# Patient Record
Sex: FEMALE | Race: OTHER | NOT HISPANIC OR LATINO | URBAN - METROPOLITAN AREA
[De-identification: names, ages, dates, MRNs, and addresses within clinical notes are randomized per-mention and may not be internally consistent; named-entity substitution may affect disease eponyms.]

---

## 2019-05-27 ENCOUNTER — EMERGENCY (EMERGENCY)
Facility: HOSPITAL | Age: 34
LOS: 1 days | End: 2019-05-27
Admitting: EMERGENCY MEDICINE
Payer: MEDICARE

## 2019-05-27 PROCEDURE — 99284 EMERGENCY DEPT VISIT MOD MDM: CPT

## 2019-05-29 PROCEDURE — 93010 ELECTROCARDIOGRAM REPORT: CPT

## 2019-06-25 ENCOUNTER — OUTPATIENT (OUTPATIENT)
Dept: OUTPATIENT SERVICES | Facility: HOSPITAL | Age: 34
LOS: 1 days | End: 2019-06-25
Payer: MEDICARE

## 2019-06-25 DIAGNOSIS — R55 SYNCOPE AND COLLAPSE: ICD-10-CM

## 2019-06-25 DIAGNOSIS — R07.9 CHEST PAIN, UNSPECIFIED: ICD-10-CM

## 2019-06-25 PROCEDURE — A9500: CPT

## 2019-06-25 PROCEDURE — 93016 CV STRESS TEST SUPVJ ONLY: CPT

## 2019-06-25 PROCEDURE — 93018 CV STRESS TEST I&R ONLY: CPT

## 2019-06-25 PROCEDURE — 93017 CV STRESS TEST TRACING ONLY: CPT

## 2019-06-25 PROCEDURE — 78452 HT MUSCLE IMAGE SPECT MULT: CPT | Mod: 26

## 2019-06-25 PROCEDURE — 78452 HT MUSCLE IMAGE SPECT MULT: CPT

## 2021-08-31 ENCOUNTER — INPATIENT (INPATIENT)
Facility: HOSPITAL | Age: 36
LOS: 1 days | Discharge: HOME | DRG: 101 | End: 2021-09-02
Attending: INTERNAL MEDICINE | Admitting: STUDENT IN AN ORGANIZED HEALTH CARE EDUCATION/TRAINING PROGRAM
Payer: MEDICARE

## 2021-08-31 VITALS
DIASTOLIC BLOOD PRESSURE: 77 MMHG | TEMPERATURE: 99 F | OXYGEN SATURATION: 100 % | RESPIRATION RATE: 20 BRPM | HEART RATE: 120 BPM | SYSTOLIC BLOOD PRESSURE: 142 MMHG

## 2021-08-31 DIAGNOSIS — R56.9 UNSPECIFIED CONVULSIONS: ICD-10-CM

## 2021-08-31 LAB
ALBUMIN SERPL ELPH-MCNC: 4.5 G/DL — SIGNIFICANT CHANGE UP (ref 3.3–5.2)
ALP SERPL-CCNC: 72 U/L — SIGNIFICANT CHANGE UP (ref 40–120)
ALT FLD-CCNC: 20 U/L — SIGNIFICANT CHANGE UP
AMPHET UR-MCNC: NEGATIVE — SIGNIFICANT CHANGE UP
ANION GAP SERPL CALC-SCNC: 24 MMOL/L — HIGH (ref 5–17)
APAP SERPL-MCNC: <3 UG/ML — LOW (ref 10–26)
APPEARANCE UR: CLEAR — SIGNIFICANT CHANGE UP
AST SERPL-CCNC: 20 U/L — SIGNIFICANT CHANGE UP
BACTERIA # UR AUTO: ABNORMAL
BARBITURATES UR SCN-MCNC: NEGATIVE — SIGNIFICANT CHANGE UP
BASE EXCESS BLDV CALC-SCNC: -5.6 MMOL/L — LOW (ref -2–3)
BASOPHILS # BLD AUTO: 0.05 K/UL — SIGNIFICANT CHANGE UP (ref 0–0.2)
BASOPHILS NFR BLD AUTO: 0.6 % — SIGNIFICANT CHANGE UP (ref 0–2)
BENZODIAZ UR-MCNC: NEGATIVE — SIGNIFICANT CHANGE UP
BILIRUB SERPL-MCNC: <0.2 MG/DL — LOW (ref 0.4–2)
BILIRUB UR-MCNC: NEGATIVE — SIGNIFICANT CHANGE UP
BUN SERPL-MCNC: 11.1 MG/DL — SIGNIFICANT CHANGE UP (ref 8–20)
CA-I SERPL-SCNC: 1.15 MMOL/L — SIGNIFICANT CHANGE UP (ref 1.15–1.33)
CALCIUM SERPL-MCNC: 9.1 MG/DL — SIGNIFICANT CHANGE UP (ref 8.6–10.2)
CHLORIDE BLDV-SCNC: 104 MMOL/L — SIGNIFICANT CHANGE UP (ref 98–107)
CHLORIDE SERPL-SCNC: 99 MMOL/L — SIGNIFICANT CHANGE UP (ref 98–107)
CO2 SERPL-SCNC: 16 MMOL/L — LOW (ref 22–29)
COCAINE METAB.OTHER UR-MCNC: NEGATIVE — SIGNIFICANT CHANGE UP
COLOR SPEC: YELLOW — SIGNIFICANT CHANGE UP
CREAT SERPL-MCNC: 0.67 MG/DL — SIGNIFICANT CHANGE UP (ref 0.5–1.3)
DIFF PNL FLD: NEGATIVE — SIGNIFICANT CHANGE UP
EOSINOPHIL # BLD AUTO: 0.02 K/UL — SIGNIFICANT CHANGE UP (ref 0–0.5)
EOSINOPHIL NFR BLD AUTO: 0.2 % — SIGNIFICANT CHANGE UP (ref 0–6)
EPI CELLS # UR: NEGATIVE — SIGNIFICANT CHANGE UP
ETHANOL SERPL-MCNC: 183 MG/DL — HIGH (ref 0–9)
GAS PNL BLDV: 136 MMOL/L — SIGNIFICANT CHANGE UP (ref 136–145)
GAS PNL BLDV: SIGNIFICANT CHANGE UP
GLUCOSE BLDV-MCNC: 240 MG/DL — HIGH (ref 70–99)
GLUCOSE SERPL-MCNC: 270 MG/DL — HIGH (ref 70–99)
GLUCOSE UR QL: 1000 MG/DL
HCG SERPL-ACNC: <4 MIU/ML — SIGNIFICANT CHANGE UP
HCO3 BLDV-SCNC: 20 MMOL/L — LOW (ref 22–29)
HCT VFR BLD CALC: 43.3 % — SIGNIFICANT CHANGE UP (ref 34.5–45)
HCT VFR BLDA CALC: 45 % — SIGNIFICANT CHANGE UP (ref 34–46)
HGB BLD CALC-MCNC: 15 G/DL — SIGNIFICANT CHANGE UP (ref 11.7–16.1)
HGB BLD-MCNC: 14.9 G/DL — SIGNIFICANT CHANGE UP (ref 11.5–15.5)
IMM GRANULOCYTES NFR BLD AUTO: 0.3 % — SIGNIFICANT CHANGE UP (ref 0–1.5)
KETONES UR-MCNC: ABNORMAL
LACTATE BLDV-MCNC: 3.3 MMOL/L — HIGH (ref 0.5–2)
LEUKOCYTE ESTERASE UR-ACNC: ABNORMAL
LYMPHOCYTES # BLD AUTO: 1.51 K/UL — SIGNIFICANT CHANGE UP (ref 1–3.3)
LYMPHOCYTES # BLD AUTO: 17.1 % — SIGNIFICANT CHANGE UP (ref 13–44)
MCHC RBC-ENTMCNC: 30.6 PG — SIGNIFICANT CHANGE UP (ref 27–34)
MCHC RBC-ENTMCNC: 34.4 GM/DL — SIGNIFICANT CHANGE UP (ref 32–36)
MCV RBC AUTO: 88.9 FL — SIGNIFICANT CHANGE UP (ref 80–100)
METHADONE UR-MCNC: NEGATIVE — SIGNIFICANT CHANGE UP
MONOCYTES # BLD AUTO: 0.47 K/UL — SIGNIFICANT CHANGE UP (ref 0–0.9)
MONOCYTES NFR BLD AUTO: 5.3 % — SIGNIFICANT CHANGE UP (ref 2–14)
NEUTROPHILS # BLD AUTO: 6.73 K/UL — SIGNIFICANT CHANGE UP (ref 1.8–7.4)
NEUTROPHILS NFR BLD AUTO: 76.5 % — SIGNIFICANT CHANGE UP (ref 43–77)
NITRITE UR-MCNC: NEGATIVE — SIGNIFICANT CHANGE UP
OPIATES UR-MCNC: NEGATIVE — SIGNIFICANT CHANGE UP
PCO2 BLDV: 35 MMHG — LOW (ref 39–42)
PCP SPEC-MCNC: SIGNIFICANT CHANGE UP
PCP UR-MCNC: NEGATIVE — SIGNIFICANT CHANGE UP
PH BLDV: 7.36 — SIGNIFICANT CHANGE UP (ref 7.32–7.43)
PH UR: 6 — SIGNIFICANT CHANGE UP (ref 5–8)
PLATELET # BLD AUTO: 280 K/UL — SIGNIFICANT CHANGE UP (ref 150–400)
PO2 BLDV: 130 MMHG — HIGH (ref 25–45)
POTASSIUM BLDV-SCNC: 3.3 MMOL/L — LOW (ref 3.5–5.1)
POTASSIUM SERPL-MCNC: 3.7 MMOL/L — SIGNIFICANT CHANGE UP (ref 3.5–5.3)
POTASSIUM SERPL-SCNC: 3.7 MMOL/L — SIGNIFICANT CHANGE UP (ref 3.5–5.3)
PROT SERPL-MCNC: 7.4 G/DL — SIGNIFICANT CHANGE UP (ref 6.6–8.7)
PROT UR-MCNC: NEGATIVE MG/DL — SIGNIFICANT CHANGE UP
RBC # BLD: 4.87 M/UL — SIGNIFICANT CHANGE UP (ref 3.8–5.2)
RBC # FLD: 12.3 % — SIGNIFICANT CHANGE UP (ref 10.3–14.5)
RBC CASTS # UR COMP ASSIST: SIGNIFICANT CHANGE UP /HPF (ref 0–4)
SALICYLATES SERPL-MCNC: <0.6 MG/DL — LOW (ref 10–20)
SAO2 % BLDV: 98.4 % — SIGNIFICANT CHANGE UP
SODIUM SERPL-SCNC: 138 MMOL/L — SIGNIFICANT CHANGE UP (ref 135–145)
SP GR SPEC: 1.01 — SIGNIFICANT CHANGE UP (ref 1.01–1.02)
THC UR QL: POSITIVE
UROBILINOGEN FLD QL: NEGATIVE MG/DL — SIGNIFICANT CHANGE UP
WBC # BLD: 8.81 K/UL — SIGNIFICANT CHANGE UP (ref 3.8–10.5)
WBC # FLD AUTO: 8.81 K/UL — SIGNIFICANT CHANGE UP (ref 3.8–10.5)
WBC UR QL: SIGNIFICANT CHANGE UP

## 2021-08-31 PROCEDURE — 99221 1ST HOSP IP/OBS SF/LOW 40: CPT

## 2021-08-31 PROCEDURE — 99223 1ST HOSP IP/OBS HIGH 75: CPT

## 2021-08-31 PROCEDURE — 93010 ELECTROCARDIOGRAM REPORT: CPT

## 2021-08-31 PROCEDURE — 70498 CT ANGIOGRAPHY NECK: CPT | Mod: 26,MA

## 2021-08-31 PROCEDURE — 70496 CT ANGIOGRAPHY HEAD: CPT | Mod: 26,MA

## 2021-08-31 PROCEDURE — 99291 CRITICAL CARE FIRST HOUR: CPT | Mod: GC

## 2021-08-31 PROCEDURE — 70450 CT HEAD/BRAIN W/O DYE: CPT | Mod: 26,MA

## 2021-08-31 RX ORDER — THIAMINE MONONITRATE (VIT B1) 100 MG
1 TABLET ORAL
Qty: 0 | Refills: 0 | DISCHARGE

## 2021-08-31 RX ORDER — EMPAGLIFLOZIN 10 MG/1
1 TABLET, FILM COATED ORAL
Qty: 0 | Refills: 0 | DISCHARGE

## 2021-08-31 RX ORDER — TRAZODONE HCL 50 MG
1 TABLET ORAL
Qty: 0 | Refills: 0 | DISCHARGE

## 2021-08-31 RX ORDER — ERGOCALCIFEROL 1.25 MG/1
1 CAPSULE ORAL
Qty: 0 | Refills: 0 | DISCHARGE

## 2021-08-31 RX ORDER — NORETHINDRONE 0.35 MG/1
1 TABLET ORAL
Qty: 0 | Refills: 0 | DISCHARGE

## 2021-08-31 RX ORDER — FLUTICASONE FUROATE AND VILANTEROL TRIFENATATE 100; 25 UG/1; UG/1
1 POWDER RESPIRATORY (INHALATION)
Qty: 0 | Refills: 0 | DISCHARGE

## 2021-08-31 RX ORDER — LEVETIRACETAM 250 MG/1
1000 TABLET, FILM COATED ORAL ONCE
Refills: 0 | Status: COMPLETED | OUTPATIENT
Start: 2021-08-31 | End: 2021-08-31

## 2021-08-31 RX ORDER — FOLIC ACID 0.8 MG
1 TABLET ORAL
Qty: 0 | Refills: 0 | DISCHARGE

## 2021-08-31 RX ORDER — INSULIN DEGLUDEC 100 U/ML
30 INJECTION, SOLUTION SUBCUTANEOUS
Qty: 0 | Refills: 0 | DISCHARGE

## 2021-08-31 RX ORDER — INSULIN ASPART 100 [IU]/ML
8 INJECTION, SOLUTION SUBCUTANEOUS
Qty: 0 | Refills: 0 | DISCHARGE

## 2021-08-31 RX ORDER — CLONAZEPAM 1 MG
1 TABLET ORAL
Qty: 0 | Refills: 0 | DISCHARGE

## 2021-08-31 RX ORDER — FLUOXETINE HCL 10 MG
1 CAPSULE ORAL
Qty: 0 | Refills: 0 | DISCHARGE

## 2021-08-31 RX ORDER — ARIPIPRAZOLE 15 MG/1
1 TABLET ORAL
Qty: 0 | Refills: 0 | DISCHARGE

## 2021-08-31 RX ORDER — SODIUM CHLORIDE 9 MG/ML
1000 INJECTION INTRAMUSCULAR; INTRAVENOUS; SUBCUTANEOUS ONCE
Refills: 0 | Status: COMPLETED | OUTPATIENT
Start: 2021-08-31 | End: 2021-08-31

## 2021-08-31 RX ADMIN — SODIUM CHLORIDE 1000 MILLILITER(S): 9 INJECTION INTRAMUSCULAR; INTRAVENOUS; SUBCUTANEOUS at 19:51

## 2021-08-31 RX ADMIN — LEVETIRACETAM 400 MILLIGRAM(S): 250 TABLET, FILM COATED ORAL at 20:29

## 2021-08-31 RX ADMIN — Medication 3 MILLIGRAM(S): at 19:15

## 2021-08-31 RX ADMIN — Medication 1 MILLIGRAM(S): at 19:11

## 2021-08-31 NOTE — ED PROVIDER NOTE - CLINICAL SUMMARY MEDICAL DECISION MAKING FREE TEXT BOX
Patient presenting AMS from [facility]. Reported drinking denying drug use. Patient with inconsistent exam noting periods of responsiveness and also flaccid paralysis. Patient with possible focal partial seizure like activity, flexed extremities, however responsive verbally. CT head performed and ativan given.

## 2021-08-31 NOTE — ED PROVIDER NOTE - PROGRESS NOTE DETAILS
Due to critical nature of potentially life-threatening diagnoses based on patient's initial presentation it is medically necessary to perform CT scan without obtaining prior creatinine or GFR with the benefits of the CT scan greatly outweighing the risks of contrast-induced nephropathy. Due to critical nature of potentially life-threatening diagnoses based on patient's initial presentation it is medically necessary to perform CT scan without obtaining prior creatinine or GFR with the benefits of the CT scan greatly outweighing the risks of contrast-induced nephropathy. Patient is unable to consent due to the nature of her medical situation however the need for CT is still urgent. Patient more awake and alert, responding and answering questions in full sentences. Neuro exam intact strength, sensation, and orientation.

## 2021-08-31 NOTE — ED ADULT TRIAGE NOTE - CHIEF COMPLAINT QUOTE
Pt had seizure while at [employer] and states she had 2 beers, when asked if she did any drugs pt laughed and denied. Pt is noted to be a little slow to respond but is oriented to time place and situation. Pt denies bitting tongue or urinary incontinence. Pt with no complaint sat this time and states she takes Klonopin only.

## 2021-08-31 NOTE — CONSULT NOTE ADULT - SUBJECTIVE AND OBJECTIVE BOX
Patient is a 36F who was at [address] this evening having a few drinks and had a seizure like episode in which 911 was called and patient was brought to the [facility] ED. Patient describes a history of seizures since she was 18 years old which have been more prominent in the past two months. Seizure activity is described as a period where she becomes unresponsive, cannot speak or respond to the people around her and her legs feel weak and often given out resulting in a fall.  Patient states that she has had multiple hospital admissions at [facility] for seizures in the past and is followed outpatient by a Neurologist named Dr. Jake Schwarz and a Psychiatrist. Patient was loaded with 1g of keprra in the ED and 4mg of ativan were given. Neuro IR was consulted after findings of an asymmetric prominent vessel in the left frontal lobe. Patient was seen at bedside by provider with examination detailed below.       Physical Exam:  Constitutional: Patient seen at bedside sleeping comfortably upon initial approach     Neuro  * Mental Status:  Patient was easily aroused to voice, but drowsy after ativan administration, followed commands and conversed appropriately   * Cranial Nerves: Pupils 5mm bilaterally and reactive, EOMI, CN 5 sensory distribution intact, CN 7 motor distribution intact, face symmetric   * Motor: RUE 5/5, LUE 5/5, RLE 5/5, LLE 5/5  * Sensory: Sensation intact to light touch  * Reflexes: Not assessed  * Gait: Not assessed      Vital Signs Last 24 Hrs  T(C): 37.1 (31 Aug 2021 18:18), Max: 37.1 (31 Aug 2021 18:18)  T(F): 98.8 (31 Aug 2021 18:18), Max: 98.8 (31 Aug 2021 18:18)  HR: 120 (31 Aug 2021 18:18) (120 - 120)  BP: 142/77 (31 Aug 2021 18:18) (142/77 - 142/77)  RR: 20 (31 Aug 2021 18:18) (20 - 20)  SpO2: 100% (31 Aug 2021 18:18) (100% - 100%)        Labs & Radiology:                        14.9   8.81  )-----------( 280      ( 31 Aug 2021 19:43 )             43.3       08-31    138  |  99  |  11.1  ----------------------------<  270<H>  3.7   |  16.0<L>  |  0.67    Ca    9.1      31 Aug 2021 19:43    TPro  7.4  /  Alb  4.5  /  TBili  <0.2<L>  /  DBili  x   /  AST  20  /  ALT  20  /  AlkPhos  72  08-31      LIVER FUNCTIONS - ( 31 Aug 2021 19:43 )  Alb: 4.5 g/dL / Pro: 7.4 g/dL / ALK PHOS: 72 U/L / ALT: 20 U/L / AST: 20 U/L / GGT: x                 Neurosurgery Imaging: I attest that all recent neurosurgical imaging was personally reviewed  < from: CT Angio Neck w/ IV Cont (08.31.21 @ 20:32) >   EXAM:  CT ANGIO NECK (W)AW IC                         EXAM:  CT ANGIO BRAIN (W)AW IC                          PROCEDURE DATE:  08/31/2021          INTERPRETATION:  CLINICAL HISTORY: ams. . .    TECHNIQUE: Contrast enhanced axial CT images were acquired from the aortic arch to the vertex of the calvarium, during the angiographic phase.  Three-dimensional maximum intensity projection reformats were generated.  90 ml of Omnipaque-350 mg/ml were administered intravenously, without immediate complication.    COMPARISON STUDY: CT head 8/31/2021  MRI brain 11/28/2019.    FINDINGS:    CT ANGIOGRAPHY NECK:    Thoracic aorta and branch vessels: Patent.  Right carotid system: Patent.  No hemodynamically significant stenosis using NASCET criteria.  Noevidence of dissection.  Left carotid system: Patent.  No hemodynamically significant stenosis using NASCET criteria.  No evidence of dissection.  Vertebral arteries: No focal stenosis or dissection.    CT ANGIOGRAPHY BRAIN:    Internal carotid arteries: Patent.  Anterior cerebral arteries: Patent.  Middle cerebral arteries: Patent.  Posterior cerebral arteries: Patent.  Vertebrobasilar: Patent.  Branch vasculature of the posterior circulation is within normal limits.    Vascular lesions: Asymmetric prominent vasculature is noted in the anterior left frontal lobe, raising suspicion for a vascular anomaly(9:208). Consider further evaluation with catheter angiography.      IMPRESSION:    CT angiography neck: No hemodynamically significant stenosis of the bilateral cervical ICAs using NASCET criteria.  Patent vertebral arteries.  No evidence of vascular dissection.    CT angiography brain:  1.  No large vessel occlusion. No evidence of aneurysm.  2.  Asymmetric prominent vasculature is notedin the anterior left frontal lobe, raising suspicion for a vascular anomaly. Consider further evaluation with catheter angiography.      --- End of Report ---            EMRE BARAKAT MD; Attending Radiologist  This document has been electronically signed. Aug 31 2021  9:08PM    < end of copied text >          Assessment: 35yo female with PMH of seizure disorder admitted to medicine service for further workup. Neuro IR was called to evaluate CTA findings of asymmetric prominent vessel in the left frontal lobe.     Plan  - Case discussed with Dr. Micki Beverly and imaging was reviewed. Dr. Beverly doesn't believe that there is an abnormality present and no further workup is warranted from a neuro IR perspective.   - Consult as needed

## 2021-08-31 NOTE — ED PROVIDER NOTE - OBJECTIVE STATEMENT
Patient is a 37yo F presenting after a possible seizure at [address]. Patient reports that she drank alcohol earlier but denied any drug use. Patient minimally responsive during exam with flaccid paralysis. No incontinence or tongue biting noted. Not offering complaints at this time.

## 2021-08-31 NOTE — ED ADULT NURSE REASSESSMENT NOTE - NS ED NURSE REASSESS COMMENT FT1
Pt received, resting quietly, endorsement of witnessed focal seizure today. Pt denies any recollection, no reports of pain at this time. A&Ox3, verbal, ambulatory. Bruising noted ion multiple stages on bilateral arms. Safety maintained will continue to monitor

## 2021-08-31 NOTE — CONSULT NOTE ADULT - ASSESSMENT
Impression: No suspicion for underlying vascular lesion could be a developmental venous anomaly however no indication for cerebral angiogram at the moment.     I have spent 60 minutes in the care and discussion of the case. The patient was seen by the PA and personally seen by me on 9/1/2021.

## 2021-08-31 NOTE — ED PROVIDER NOTE - PHYSICAL EXAMINATION
General: Stated age female in no acute distress, intermittently responsive and asleep  HEENT: Normocephalic, atraumatic. Moist mucous membranes. Oropharynx clear.  Eyes: No scleral icterus. No conjunctival pallor. EOMI. FERNIE.  Neck: Soft and supple. Full ROM without pain.  Cardiac: Regular rate and regular rhythm. No murmurs. No LE edema.  Resp: Lungs CTAB. No wheezes, rales or rhonchi.  Abd: Soft, non-tender, non-distended. No guarding or rebound. No scars, masses, or lesions.  Back: Spine midline and non-tender. No CVA tenderness.    Skin: No rashes, abrasions, or lacerations.  Neuro: AO x 3. 0/5 strength all extremities. Arm drop test negative. Not responsive to toxic stimuli in all extremities.

## 2021-08-31 NOTE — CONSULT NOTE ADULT - ASSESSMENT
IMP: Patient is a 36y old  Female who presents with a chief complaint of seizure.  Patient was drinking (etoh=183) and gambling at [employer] when she had seizure prodrome and then had seizure.   Presented to ed [facility] with "alternation awareness intact with flaccid paralysis" per ED MD.  Given Ativan and Keppra in ED     Hx seizures, migraine headaches, depression, bipolar, anxiety     CTA WITH  Asymmetric prominent vasculature is noted in the anterior left frontal lobe, raising suspicion for a vascular anomaly.    Patient w abnormal MRI 11/2019  non compliant w neurology f/u for seizures,  she states she takes prescribed meds.      neuro exam intact     possible UTI    IMP:  ADMIT MED SVC   MRI BRAIN +/- CONTRAST   MRA BRAIN  Q2H NEURO CKS UNTIL MRI/MRA DONE   NEUROLOGY, NEURO INTERVENTIONAL RADIOLOGY, NEURO ICU, PSYCHIATRIC CONSULTS  SEIZURE PRECAUTIONS.     DISPO AS PER NICU/ NEURO IR

## 2021-08-31 NOTE — CONSULT NOTE ADULT - SUBJECTIVE AND OBJECTIVE BOX
CC: Patient is a 36y old  Female who presents with a chief complaint of seizure  Source; Patient herself, competent   HPI: Patient is a 36y old  Female who presents with a chief complaint of seizure.  Patient was drinking (etoh=183) and gambling at [employer] when she had seizure prodrome and then had seizure.   presented to ed [facility] with alternation awareness intact with flaccid paralysis" per ED MD.  Patient is non compliant with f/u w neurology ( has w/u in [facility])   Patient intact on my exam. VSS  No recent trauma or illness.   Denied incontinence.   No tongue biting   Given Keppra and Ativan in ED     -headache      - Nausea / - Vomiting   denies weakness  denies numbness/ tingling  admits  poor vision baseline      PAST MEDICAL:  DM, MIGRAINE HA  DEPRESSION  ANXIETY  BIPOLAR   ASTHMA      SURGICAL HISTORY:  denied    SOCIAL HISTORY: +  EtOH, +  tobacco 1/2p per wk in past quit 7 yrs ago, +  drugs in past, heroin, cocaine, marijuana none in 10 yrs    FAMILY HISTORY:  Mother: cardiac, uterine fibroids      ROS:  CONSTITUTIONAL: No fever, weight loss, or fatigue  EYES: No eye pain, + visual disturbances, or discharge  ENMT:  No difficulty hearing, tinnitus, vertigo; No sinus or throat pain  NECK: No pain or stiffness  RESPIRATORY: No cough, wheezing, chills or hemoptysis; No shortness of breath  CARDIOVASCULAR: +chest pain, palpitations, dizziness, or leg swelling  GASTROINTESTINAL: No abdominal or epigastric pain. + nausea, +vomiting, or hematemesis; No diarrhea or constipation. No melena or hematochezia.  GENITOURINARY: No dysuria,+ frequency, hematuria, no incontinence  NEUROLOGICAL: +headaches, memory loss, loss of strength, numbness, or tremors  SKIN: No itching, burning, rashes, or lesions   LYMPH NODES: No enlarged glands  ENDOCRINE: No heat or cold intolerance; No hair loss  MUSCULOSKELETAL: No joint pain or swelling; No muscle, back, or extremity pain, + frequent  cramping right  lower leg  PSYCHIATRIC: + depression, +anxiety,+ mood swings, + difficulty sleeping  HEME/LYMPH: No easy bruising, or bleeding gums  ALLERGY AND IMMUNOLOGIC: No hives or eczema      MEDICATIONS  (STANDING): ABILIFY, BREO ELIPTRA,  CLONAZEPAM,  ERGOCALCIFEROL,  FLUOXETINE, FOLIC ACID, JARDIANCE, NORETHINDRONE, NOVOLOG, THIAMINE, TRAZODONE, TRESIBA     Allergies: Benadryl (Other (Severe))      Vital Signs Last 24 Hrs  T(C): 37.1 (31 Aug 2021 18:18), Max: 37.1 (31 Aug 2021 18:18)  T(F): 98.8 (31 Aug 2021 18:18), Max: 98.8 (31 Aug 2021 18:18)  HR: 120 (31 Aug 2021 18:18) (120 - 120)  BP: 142/77 (31 Aug 2021 18:18) (142/77 - 142/77)  BP(mean): --  RR: 20 (31 Aug 2021 18:18) (20 - 20)  SpO2: 100% (31 Aug 2021 18:18) (100% - 100%)    PHYSICAL EXAM:  GENERAL: NAD, well-groomed, well-developed  HEAD:  Atraumatic, Normocephalic  EYES: EOMI, PERRLA, conjunctiva and sclera clear  ENMT:; Moist mucous membranes, Good dentition  NECK: Supple, No JVD  NERVOUS SYSTEM:  Alert & Oriented X3, Good concentration;   perrla, pupils dilated 5 to 4 ml, eomi, cranial nerves 2-12 intact.  gross acuity  and fields intact.   memory  intact  speech clear   Motor Strength 5/5 B/L upper and lower extremities  Sensory intact all   fine motor and coordination intact all   no pronator drift.   CHEST/LUNG: Clear bilaterally; No rales, rhonchi, wheezing, or rubs  HEART: Regular rate   ABDOMEN: Soft, Nontender, Nondistended; Bowel sounds present  EXTREMITIES:  2+ Peripheral Pulses radial and DP, No clubbing, cyanosis, or edema  LYMPH: No lymphadenopathy noted  SKIN: No rashes or lesions      RADIOLOGY & ADDITIONAL STUDIES:  EXAM: CT BRAIN  PROCEDURE DATE: 2021  INTERPRETATION: CLINICAL INDICATION: Altered mental status, seizure  5mm axial sections of the brain were obtained from base to vertex, without the intravenous administration of contrast material. Coronal and sagittal computer generated reconstructed views are available.  Comparison is made with the prior MRI of 2019 and demonstrates no significant interval change.  The fourth, third and lateral ventricles are normal size and position. There is no hemorrhage, mass or shift of the midline structures. There is normal gray white matter differentiation. Bone window examination is unremarkable.  IMPRESSION: Unremarkable noncontrast CT of the brain. No hemorrhage or mass. No change from 2019.  EXAM: CT ANGIO NECK (W)AW IC  EXAM: CT ANGIO BRAIN (W)AW IC  PROCEDURE DATE: 2021  INTERPRETATION: CLINICAL HISTORY: ams. . .  TECHNIQUE: Contrast enhanced axial CT images were acquired from the aortic arch to the vertex of the calvarium, during the angiographic phase. Three-dimensional maximum intensity projection reformats were generated. 90 ml of Omnipaque-350 mg/ml were administered intravenously, without immediate complication.  COMPARISON STUDY: CT head 2021  MRI brain 2019.  FINDINGS:  CT ANGIOGRAPHY NECK:  Thoracic aorta and branch vessels: Patent.  Right carotid system: Patent. No hemodynamically significant stenosis using NASCET criteria. No evidence of dissection.  Left carotid system: Patent. No hemodynamically significant stenosis using NASCET criteria. No evidence of dissection.  Vertebral arteries: No focal stenosis or dissection.  CT ANGIOGRAPHY BRAIN:  Internal carotid arteries: Patent.  Anterior cerebral arteries: Patent.  Middle cerebral arteries: Patent.  Posterior cerebral arteries: Patent.  Vertebrobasilar: Patent. Branch vasculature of the posterior circulation is within normal limits.  Vascular lesions: Asymmetric prominent vasculature is noted in the anterior left frontal lobe, raising suspicion for a vascular anomaly(9:208). Consider further evaluation with catheter angiography.  IMPRESSION:  CT angiography neck: No hemodynamically significant stenosis of the bilateral cervical ICAs using NASCET criteria. Patent vertebral arteries. No evidence of vascular dissection.  CT angiography brain:  1. No large vessel occlusion. No evidence of aneurysm.  2. Asymmetric prominent vasculature is noted in the anterior left frontal lobe, raising suspicion for a vascular anomaly. Consider further evaluation with catheter angiography        PROCEDURE: MRI 1003 _ MRI BRAIN W/O,W/ 40054  DATE: 2019  ORDER NO: [id number]  MRI of the brain without and with intravenous gadolinium on 2019  INDICATION: 34-year-old female found to have a small focus of abnormal  signal on T2 and FLAIR in the right parietal lobe on a noncontrast MRI dated  2000 1990 referred for additional evaluation.  FINDINGS: Multisequence MRI of the brain was performed including axial T2  FLAIR, axial T1, diffusion and ADC sequences.. 6.7 cc of gadolinium were  administered. Axial sagittal and post contrast T1-weighted sequences were  obtained. Reference is made to the prior study dated 2019.  FINDINGS: A 2 mm focus of increased signal is seen in the right parietal  region on the FLAIR sequence as noted on the prior study. The appearance is  stable. There is no corresponding increase in signal on diffusion-weighted  imaging. No enhancement is seen following administration of gadolinium.  IMPRESSION:  A single 2 mm focus of increased FLAIR signal in the right parietal lobe  does not demonstrate enhancement or evidence of acute infarct and is  nonspecific. Considerations include sequela of migraine headache, sequela of  nonactive demyelinating process or infectious etiologies such as Lyme  disease. Imaging surveillance is recommended as is neurologic evaluation.                     14.9   8.81  )-----------( 280      ( 31 Aug 2021 19:43 )             43.3         138  |  99  |  11.1  ----------------------------<  270<H>  3.7   |  16.0<L>  |  0.67    Ca    9.1      31 Aug 2021 19:43    TPro  7.4  /  Alb  4.5  /  TBili  <0.2<L>  /  DBili  x   /  AST  20  /  ALT  20  /  AlkPhos  72        Urinalysis Basic - ( 31 Aug 2021 22:18 )    Color: Yellow / Appearance: Clear / S.010 / pH: x  Gluc: x / Ketone: Small  / Bili: Negative / Urobili: Negative mg/dL   Blood: x / Protein: Negative mg/dL / Nitrite: Negative   Leuk Esterase: Trace / RBC: 0-2 /HPF / WBC 3-5   Sq Epi: x / Non Sq Epi: Negative / Bacteria: Occasional

## 2021-08-31 NOTE — ED ADULT NURSE NOTE - OBJECTIVE STATEMENT
pt awake and confused , states that she had a seizure while she was at [facility] , resp even and unlabored no distress noted

## 2021-09-01 ENCOUNTER — OUTPATIENT (OUTPATIENT)
Dept: OUTPATIENT SERVICES | Facility: HOSPITAL | Age: 36
LOS: 1 days | End: 2021-09-01
Payer: MEDICARE

## 2021-09-01 DIAGNOSIS — Z91.19 PATIENT'S NONCOMPLIANCE WITH OTHER MEDICAL TREATMENT AND REGIMEN: ICD-10-CM

## 2021-09-01 DIAGNOSIS — F10.10 ALCOHOL ABUSE, UNCOMPLICATED: ICD-10-CM

## 2021-09-01 DIAGNOSIS — E11.9 TYPE 2 DIABETES MELLITUS WITHOUT COMPLICATIONS: ICD-10-CM

## 2021-09-01 DIAGNOSIS — Z86.59 PERSONAL HISTORY OF OTHER MENTAL AND BEHAVIORAL DISORDERS: ICD-10-CM

## 2021-09-01 DIAGNOSIS — G40.909 EPILEPSY, UNSPECIFIED, NOT INTRACTABLE, WITHOUT STATUS EPILEPTICUS: ICD-10-CM

## 2021-09-01 LAB
ANION GAP SERPL CALC-SCNC: 12 MMOL/L — SIGNIFICANT CHANGE UP (ref 5–17)
ANION GAP SERPL CALC-SCNC: 14 MMOL/L — SIGNIFICANT CHANGE UP (ref 5–17)
BASE EXCESS BLDV CALC-SCNC: -6.3 MMOL/L — LOW (ref -2–3)
BUN SERPL-MCNC: 12.1 MG/DL — SIGNIFICANT CHANGE UP (ref 8–20)
BUN SERPL-MCNC: 9.7 MG/DL — SIGNIFICANT CHANGE UP (ref 8–20)
CA-I SERPL-SCNC: 1.11 MMOL/L — LOW (ref 1.15–1.33)
CALCIUM SERPL-MCNC: 8.8 MG/DL — SIGNIFICANT CHANGE UP (ref 8.6–10.2)
CALCIUM SERPL-MCNC: 8.9 MG/DL — SIGNIFICANT CHANGE UP (ref 8.6–10.2)
CHLORIDE BLDV-SCNC: 107 MMOL/L — SIGNIFICANT CHANGE UP (ref 98–107)
CHLORIDE SERPL-SCNC: 102 MMOL/L — SIGNIFICANT CHANGE UP (ref 98–107)
CHLORIDE SERPL-SCNC: 106 MMOL/L — SIGNIFICANT CHANGE UP (ref 98–107)
CO2 SERPL-SCNC: 19 MMOL/L — LOW (ref 22–29)
CO2 SERPL-SCNC: 21 MMOL/L — LOW (ref 22–29)
CREAT SERPL-MCNC: 0.47 MG/DL — LOW (ref 0.5–1.3)
CREAT SERPL-MCNC: 0.51 MG/DL — SIGNIFICANT CHANGE UP (ref 0.5–1.3)
GAS PNL BLDV: 139 MMOL/L — SIGNIFICANT CHANGE UP (ref 136–145)
GAS PNL BLDV: SIGNIFICANT CHANGE UP
GAS PNL BLDV: SIGNIFICANT CHANGE UP
GLUCOSE BLDC GLUCOMTR-MCNC: 116 MG/DL — HIGH (ref 70–99)
GLUCOSE BLDC GLUCOMTR-MCNC: 128 MG/DL — HIGH (ref 70–99)
GLUCOSE BLDC GLUCOMTR-MCNC: 141 MG/DL — HIGH (ref 70–99)
GLUCOSE BLDV-MCNC: 110 MG/DL — HIGH (ref 70–99)
GLUCOSE SERPL-MCNC: 110 MG/DL — HIGH (ref 70–99)
GLUCOSE SERPL-MCNC: 117 MG/DL — HIGH (ref 70–99)
HCO3 BLDV-SCNC: 19 MMOL/L — LOW (ref 22–29)
HCT VFR BLDA CALC: 44 % — SIGNIFICANT CHANGE UP (ref 34–46)
HGB BLD CALC-MCNC: 14.6 G/DL — SIGNIFICANT CHANGE UP (ref 11.7–16.1)
LACTATE BLDV-MCNC: 2.4 MMOL/L — HIGH (ref 0.5–2)
MAGNESIUM SERPL-MCNC: 2.1 MG/DL — SIGNIFICANT CHANGE UP (ref 1.6–2.6)
PCO2 BLDV: 35 MMHG — LOW (ref 39–42)
PH BLDV: 7.35 — SIGNIFICANT CHANGE UP (ref 7.32–7.43)
PO2 BLDV: 75 MMHG — HIGH (ref 25–45)
POTASSIUM BLDV-SCNC: 3.4 MMOL/L — LOW (ref 3.5–5.1)
POTASSIUM SERPL-MCNC: 3.4 MMOL/L — LOW (ref 3.5–5.3)
POTASSIUM SERPL-MCNC: 4 MMOL/L — SIGNIFICANT CHANGE UP (ref 3.5–5.3)
POTASSIUM SERPL-SCNC: 3.4 MMOL/L — LOW (ref 3.5–5.3)
POTASSIUM SERPL-SCNC: 4 MMOL/L — SIGNIFICANT CHANGE UP (ref 3.5–5.3)
SAO2 % BLDV: 95.2 % — SIGNIFICANT CHANGE UP
SARS-COV-2 RNA SPEC QL NAA+PROBE: SIGNIFICANT CHANGE UP
SODIUM SERPL-SCNC: 135 MMOL/L — SIGNIFICANT CHANGE UP (ref 135–145)
SODIUM SERPL-SCNC: 139 MMOL/L — SIGNIFICANT CHANGE UP (ref 135–145)

## 2021-09-01 PROCEDURE — 93010 ELECTROCARDIOGRAM REPORT: CPT

## 2021-09-01 PROCEDURE — 99232 SBSQ HOSP IP/OBS MODERATE 35: CPT

## 2021-09-01 PROCEDURE — 12345: CPT | Mod: NC

## 2021-09-01 PROCEDURE — 99223 1ST HOSP IP/OBS HIGH 75: CPT

## 2021-09-01 PROCEDURE — 70553 MRI BRAIN STEM W/O & W/DYE: CPT | Mod: 26

## 2021-09-01 PROCEDURE — 71045 X-RAY EXAM CHEST 1 VIEW: CPT | Mod: 26

## 2021-09-01 PROCEDURE — 95720 EEG PHY/QHP EA INCR W/VEEG: CPT

## 2021-09-01 PROCEDURE — 70544 MR ANGIOGRAPHY HEAD W/O DYE: CPT | Mod: 26,59

## 2021-09-01 RX ORDER — INSULIN GLARGINE 100 [IU]/ML
5 INJECTION, SOLUTION SUBCUTANEOUS AT BEDTIME
Refills: 0 | Status: DISCONTINUED | OUTPATIENT
Start: 2021-09-01 | End: 2021-09-02

## 2021-09-01 RX ORDER — SODIUM CHLORIDE 9 MG/ML
1000 INJECTION, SOLUTION INTRAVENOUS
Refills: 0 | Status: DISCONTINUED | OUTPATIENT
Start: 2021-09-01 | End: 2021-09-02

## 2021-09-01 RX ORDER — FOLIC ACID 0.8 MG
1 TABLET ORAL DAILY
Refills: 0 | Status: DISCONTINUED | OUTPATIENT
Start: 2021-09-01 | End: 2021-09-02

## 2021-09-01 RX ORDER — FLUOXETINE HCL 10 MG
30 CAPSULE ORAL DAILY
Refills: 0 | Status: DISCONTINUED | OUTPATIENT
Start: 2021-09-01 | End: 2021-09-02

## 2021-09-01 RX ORDER — DEXTROSE 50 % IN WATER 50 %
15 SYRINGE (ML) INTRAVENOUS ONCE
Refills: 0 | Status: DISCONTINUED | OUTPATIENT
Start: 2021-09-01 | End: 2021-09-02

## 2021-09-01 RX ORDER — ALBUTEROL 90 UG/1
2.5 AEROSOL, METERED ORAL EVERY 6 HOURS
Refills: 0 | Status: DISCONTINUED | OUTPATIENT
Start: 2021-09-01 | End: 2021-09-02

## 2021-09-01 RX ORDER — SODIUM CHLORIDE 9 MG/ML
500 INJECTION INTRAMUSCULAR; INTRAVENOUS; SUBCUTANEOUS ONCE
Refills: 0 | Status: COMPLETED | OUTPATIENT
Start: 2021-09-01 | End: 2021-09-01

## 2021-09-01 RX ORDER — ACETAMINOPHEN 500 MG
650 TABLET ORAL EVERY 6 HOURS
Refills: 0 | Status: DISCONTINUED | OUTPATIENT
Start: 2021-09-01 | End: 2021-09-02

## 2021-09-01 RX ORDER — CLONAZEPAM 1 MG
0.5 TABLET ORAL THREE TIMES A DAY
Refills: 0 | Status: DISCONTINUED | OUTPATIENT
Start: 2021-09-01 | End: 2021-09-02

## 2021-09-01 RX ORDER — ARIPIPRAZOLE 15 MG/1
2 TABLET ORAL DAILY
Refills: 0 | Status: DISCONTINUED | OUTPATIENT
Start: 2021-09-01 | End: 2021-09-02

## 2021-09-01 RX ORDER — DEXTROSE 50 % IN WATER 50 %
25 SYRINGE (ML) INTRAVENOUS ONCE
Refills: 0 | Status: DISCONTINUED | OUTPATIENT
Start: 2021-09-01 | End: 2021-09-02

## 2021-09-01 RX ORDER — LANOLIN ALCOHOL/MO/W.PET/CERES
5 CREAM (GRAM) TOPICAL ONCE
Refills: 0 | Status: COMPLETED | OUTPATIENT
Start: 2021-09-01 | End: 2021-09-01

## 2021-09-01 RX ORDER — POTASSIUM CHLORIDE 20 MEQ
40 PACKET (EA) ORAL ONCE
Refills: 0 | Status: COMPLETED | OUTPATIENT
Start: 2021-09-01 | End: 2021-09-01

## 2021-09-01 RX ORDER — GLUCAGON INJECTION, SOLUTION 0.5 MG/.1ML
1 INJECTION, SOLUTION SUBCUTANEOUS ONCE
Refills: 0 | Status: DISCONTINUED | OUTPATIENT
Start: 2021-09-01 | End: 2021-09-02

## 2021-09-01 RX ORDER — INSULIN LISPRO 100/ML
VIAL (ML) SUBCUTANEOUS AT BEDTIME
Refills: 0 | Status: DISCONTINUED | OUTPATIENT
Start: 2021-09-01 | End: 2021-09-02

## 2021-09-01 RX ORDER — INFLUENZA VIRUS VACCINE 15; 15; 15; 15 UG/.5ML; UG/.5ML; UG/.5ML; UG/.5ML
0.5 SUSPENSION INTRAMUSCULAR ONCE
Refills: 0 | Status: DISCONTINUED | OUTPATIENT
Start: 2021-09-01 | End: 2021-09-02

## 2021-09-01 RX ORDER — INSULIN LISPRO 100/ML
VIAL (ML) SUBCUTANEOUS
Refills: 0 | Status: DISCONTINUED | OUTPATIENT
Start: 2021-09-01 | End: 2021-09-02

## 2021-09-01 RX ORDER — DEXTROSE 50 % IN WATER 50 %
12.5 SYRINGE (ML) INTRAVENOUS ONCE
Refills: 0 | Status: DISCONTINUED | OUTPATIENT
Start: 2021-09-01 | End: 2021-09-02

## 2021-09-01 RX ORDER — THIAMINE MONONITRATE (VIT B1) 100 MG
100 TABLET ORAL DAILY
Refills: 0 | Status: DISCONTINUED | OUTPATIENT
Start: 2021-09-01 | End: 2021-09-02

## 2021-09-01 RX ORDER — ONDANSETRON 8 MG/1
4 TABLET, FILM COATED ORAL EVERY 6 HOURS
Refills: 0 | Status: DISCONTINUED | OUTPATIENT
Start: 2021-09-01 | End: 2021-09-02

## 2021-09-01 RX ADMIN — Medication 5 MILLIGRAM(S): at 23:14

## 2021-09-01 RX ADMIN — Medication 0.5 MILLIGRAM(S): at 08:04

## 2021-09-01 RX ADMIN — Medication 0.5 MILLIGRAM(S): at 22:09

## 2021-09-01 RX ADMIN — Medication 30 MILLIGRAM(S): at 13:39

## 2021-09-01 RX ADMIN — ARIPIPRAZOLE 2 MILLIGRAM(S): 15 TABLET ORAL at 13:39

## 2021-09-01 RX ADMIN — Medication 1 TABLET(S): at 12:16

## 2021-09-01 RX ADMIN — Medication 100 MILLIGRAM(S): at 12:16

## 2021-09-01 RX ADMIN — SODIUM CHLORIDE 500 MILLILITER(S): 9 INJECTION INTRAMUSCULAR; INTRAVENOUS; SUBCUTANEOUS at 01:54

## 2021-09-01 RX ADMIN — Medication 1 MILLIGRAM(S): at 12:21

## 2021-09-01 RX ADMIN — INSULIN GLARGINE 5 UNIT(S): 100 INJECTION, SOLUTION SUBCUTANEOUS at 22:14

## 2021-09-01 RX ADMIN — Medication 0.5 MILLIGRAM(S): at 15:46

## 2021-09-01 RX ADMIN — Medication 40 MILLIEQUIVALENT(S): at 08:59

## 2021-09-01 NOTE — H&P ADULT - NSHPPHYSICALEXAM_GEN_ALL_CORE
Vital Signs Last 24 Hrs  T(C): 37.1 (31 Aug 2021 18:18), Max: 37.1 (31 Aug 2021 18:18)  T(F): 98.8 (31 Aug 2021 18:18), Max: 98.8 (31 Aug 2021 18:18)  HR: 120 (31 Aug 2021 18:18) (120 - 120)  BP: 142/77 (31 Aug 2021 18:18) (142/77 - 142/77)  BP(mean): --  RR: 20 (31 Aug 2021 18:18) (20 - 20)  SpO2: 100% (31 Aug 2021 18:18) (100% - 100%)    General: pt. lying in bed , tired looking but not in distress, answering questions appropriately.   HEENT: AT, NC. PERRL. intact EOM. no throat erythema or exudate.   Neck: supple. no JVD.  Chest: CTA bilaterally  Heart: normal S1,S2. RRR. no heart murmur. no edema.   Abdomen: soft. non-tender. non-distended. + BS.   Ext: no calf tenderness. ROM of all ext. intact, distal pulses 2 +.   Neuro: tired appearing but AAO x3. no focal weakness. no speech disorder, cns intact, m/r/s intact.  Skin: no rash noted, warm and dry, no pallor.   psych : mood ok, tired but no si/hi.

## 2021-09-01 NOTE — H&P ADULT - HISTORY OF PRESENT ILLNESS
35 y/o female with h/o seizure disorder ( non compliant with meds as per pt. last use of seizure medicine was long time ago ) , DM, bipolar disorder, asthma [place] at a [place] in the area, was drinking alcohol and had a seizure episode and was brought to the ER. pt. stated that she had another seizure 2 weeks as well but did not come to the hospital. no cp, no sob, no fever, no abd. pain, no n/v/d. no tongue bite, no fecal / urine loss.  37 y/o female with h/o seizure disorder ( non compliant with meds as per pt. last use of seizure medicine was long time ago ) , DM, bipolar disorder, asthma was at a casino in the area, was drinking alcohol and had a seizure episode and was brought to the ER. pt. stated that she had another seizure 2 weeks as well but did not come to the hospital. no cp, no sob, no fever, no abd. pain, no n/v/d. no tongue bite, no fecal / urine loss. As per pt. she is on abilify 2 mg daily, prozac 30 mg daily.     In the ER pt. had ct head : Unremarkable noncontrast CT of the brain. No hemorrhage or mass. No change from 11/28/2019.     CT angiography neck: No hemodynamically significant stenosis of the bilateral cervical ICAs using NASCET criteria.  Patent vertebral arteries.  No evidence of vascular dissection.    CT angiography brain:  1.  No large vessel occlusion. No evidence of aneurysm.  2.  Asymmetric prominent vasculature is noted in the anterior left frontal lobe, raising suspicion for a vascular anomaly. Consider further evaluation with catheter angiography.    pt. was seen by Neuro radiology and neuro surgery teams in the ER. no intervention by neuro radiology is recommended, neuro surgery recommended mri / mra brain with and without contrast.  37 y/o female with h/o seizure disorder ( non compliant with meds as per pt. last use of seizure medicine was long time ago ) , DM, bipolar disorder, asthma was at a casino in the area, was drinking alcohol and had a seizure episode and was brought to the ER. pt. stated that she had another seizure 2 weeks as well but did not come to the hospital. no cp, no sob, no fever, no abd. pain, no n/v/d. no tongue bite, no fecal / urine loss. As per pt. she is on abilify 2 mg daily, prozac 30 mg daily. pt. got iv keppra and iv ativan by the ER team.     In the ER pt. had ct head : Unremarkable noncontrast CT of the brain. No hemorrhage or mass. No change from 11/28/2019.     CT angiography neck: No hemodynamically significant stenosis of the bilateral cervical ICAs using NASCET criteria.  Patent vertebral arteries.  No evidence of vascular dissection.    CT angiography brain:  1.  No large vessel occlusion. No evidence of aneurysm.  2.  Asymmetric prominent vasculature is noted in the anterior left frontal lobe, raising suspicion for a vascular anomaly. Consider further evaluation with catheter angiography.    pt. was seen by Neuro radiology and neuro surgery teams in the ER. no intervention by neuro radiology is recommended, neuro surgery recommended mri / mra brain with and without contrast.

## 2021-09-01 NOTE — CHART NOTE - NSCHARTNOTEFT_GEN_A_CORE
Per RN, pt had IV infiltrate with IV contrast   Pt examined  LUE site tender to touch, neurovascularly intact otherwise  Apply ice packs   RN to monitor, assess, escalate to PA PRN  Will continue to monitor

## 2021-09-01 NOTE — H&P ADULT - ASSESSMENT
37 y/o female with h/o seizure disorder ( non compliant with meds as per pt. last use of seizure medicine was long time ago ) , DM, bipolar disorder, asthma was at a casino in the area, was drinking alcohol and had a seizure episode and was brought to the ER. pt. stated that she had another seizure 2 weeks as well but did not come to the hospital. no cp, no sob, no fever, no abd. pain, no n/v/d. no tongue bite, no fecal / urine loss. As per pt. she is on abilify 2 mg daily, prozac 30 mg daily. pt. got iv keppra and iv ativan by the ER team.

## 2021-09-01 NOTE — H&P ADULT - NSHPSOCIALHISTORY_GEN_ALL_CORE
reports using edible marijuana weekly, no other illicit drug use reported, occasionally etoh, denies drinking alcohol daily.

## 2021-09-01 NOTE — CONSULT NOTE ADULT - ASSESSMENT
35yo RH Female with a history of DM, bipolar, depression, anxiety, migraine headache (follows [facility] neurology) asthma and seizure-like nonepileptic events who was brought from [place] [address] for possible seizure. Personally reviewed all imagines, labs and other studies.    Impression:  1. Initial presentation of AMS, generalized weakness, dizziness: Had vodka cocktail, when normally does not drink EtOH beverage at all. Presentation suggests EtOH intoxication.  2. Seizure-like activity: Has a prior history of nonepileptic events consistent with full body stiffness, left facial twitching and decreased responsiveness. Now reporting episodic right lower extremity cramping/twitching. Will further evaluate with cvEEG.  3. Asymmetric prominent vasculature in the anterior left frontal lobe  4. DM, bipolar, depression, anxiety, migraine headache, asthma      Recommendation:  - transfer to Epilepsy Monitoring Unit (EMU) on 6T for cvEEG  - no indication for antiepileptic medication at this time  - continue clonazepam 0.5mg TID for anxiety  - MRI brain w/wo, MRA head w/o pending   - NSG following   - seizure precaution  - management of other medical problems per primary team    - follow-up with established neurologist at [facility]       Thank you for allowing Epilepsy to participate in the care of this patient.   ______________________  Ar Mac MD   Director, Epilepsy/EMU - [facility]   Epilepsy Consult #: [phone]-EPILEPSY ([phone])  35yo RH Female with a history of DM, bipolar, depression, anxiety, migraine headache (follows [facility] neurology) asthma and nonepileptic events who was brought from [place] [address] for possible seizure. Personally reviewed all imagines, EEG, labs and other studies.    Impression:  1. Initial presentation of AMS, generalized weakness, dizziness: Had vodka cocktail, when normally does not drink EtOH beverage. Presentation suggests EtOH intoxication.  2. Seizure-like activity: Has a prior history of nonepileptic events. Will further evaluate with cvEEG.  3. Asymmetric prominent vasculature in the anterior left frontal lobe  4. DM, bipolar, depression, anxiety, migraine headache, asthma      Recommendation:  - transfer to Epilepsy Monitoring Unit (EMU) on 6T for cvEEG  - no indication for antiepileptic medication at this time  - continue clonazepam 0.5mg TID for anxiety  - MRI brain w/wo, MRA head w/o pending   - NSG following   - seizure precaution  - management of other medical problems per primary team    - follow-up with established neurologist at [facility]       Thank you for allowing Epilepsy to participate in the care of this patient.   ______________________  Ar Mac MD   Director, Epilepsy/EMU - [facility]   Epilepsy Consult #: [phone]-EPILEPSY ([phone])

## 2021-09-01 NOTE — H&P ADULT - PROBLEM SELECTOR PLAN 3
will keep pt. on lantus and admelog scale, pt's venous pH with normal range, metabolic acidosis likely related to seizure activity, etoh use and dehydration, will repeat bmp to be followed, got 1 L NS before another 500 cc NS bolus ordered.

## 2021-09-01 NOTE — H&P ADULT - PROBLEM SELECTOR PLAN 1
pt's non compliance to seizure meds and etoh abuse with her psych meds all likely contributed to her seizure activity. pt. will be on seizure precautions and iv ativan prn for seizure activity. epilepsy consult will be requested and seizure medicine plan as per epilepsy consultant. seizure precautions.  neuro surgery and neuro radiology consults appreciated, follow brain mri. mra. will keep on compression boots for now till mri / mra brain studies are done if negative can be on subcut heparin for dvt prophylaxis, , ct angio brain reported : .  Asymmetric prominent vasculature is noted in the anterior left frontal lobe, raising suspicion for a vascular anomaly. Consider further evaluation with catheter angiography.

## 2021-09-01 NOTE — CHART NOTE - NSCHARTNOTEFT_GEN_A_CORE
Neurosurgery     Patient seen and examined   Neuro intact   CTA w/ venous anomaly like DVA     Images and case reviewed with Dr. Hu   No neurosurgerical intervention   MRI/MRA   Follow up with neurology for seizure disorder   Please reconsult prn

## 2021-09-01 NOTE — CONSULT NOTE ADULT - SUBJECTIVE AND OBJECTIVE BOX
[facility]                                                                     MD Zuly Day DO                                              Epilepsy Consult #: [phone] ([phone])                                               Office: [address] [place] [address], [address]                                                 Phone: [phone]; Fax: [phone]                            ==============================================    EPILEPSY INITIAL CONSULT NOTE      ADMITTING DIAGNOSIS: Convulsions        HPI:  This is a 36y RH Female with a history of DM, bipolar, depression, anxiety, migraine headache (follows [facility] neurology), asthma and seizure-like nonepileptic events who was brought from [place] [address] for possible seizure.     Patient was gambling at [place] [address], frustrated that she lost money, so she had a vodka cranberry cocktail. Patient typically does not drink EtOH beverage. Thirty to 60m later, pt began to feel lightheaded, dizzy and a little zoned out. This ill feeling worsened to generalized weakness with "jello" legs and eventual loss of awareness. She remembers bits and pieces of herself lying on the floor and being brought to [facility] via ambulance. After arrival to ER, patient stated that she has history of seizures. Got levetiracetam 1gm and a total of lorazepam 4mg in ER.     Upon further clarification this morning, patient does not have a definitive diagnosis of seizure. She had been seen by epileptologist Dr. Schwarz and no antiepileptic medication was started. Prior workup including cvEEG has been unremarkable per patient. Since ~2017, patient has been having episodic right lower extremity cramping and arrhythmic twitching accompanied by visual hallucination (eg. lights, figures, bugs) in the right eye (not visual field). No impaired awareness or language difficulties during these episodes. Each episode lasts about 1-2m, occurring 1-2 times daily.     In mid-, also had a seizure-like event. Patient woke up from sleep, feeling she was still in a dream. She got out of the bed and tried to walk upstairs inside her house, and suddenly she became paralyzed from neck down. Her 4 extremities were contracts and stiff, and she couldn't move or speak. This lasted for about 10m or so, gradually resolved inside ambulance en route to [facility]. She was started on clonazepam after this event.    Spoke with Dr. Schwarz. Patient has had couple of inpatient admissions for seizure-like events at [facility]. The latest admission was in , where episodes of whole body stiffness, left facial twitching and decreased responsiveness was captured without ictal correlate on EEG.     Of note, due to initial presentation of AMS, patient underwent CTH and CTA H/N in ER, which showed "asymmetric prominent vasculature is noted in the anterior left frontal lobe, raising suspicion for a vascular anomaly." Imaging reviewed by the Neuro IR team and didn't think any further neur IR workup is needed. NSG also consulted, who recommended MRI brain and MRA head.    SEIZURE RISK FACTORS:  Younger sister with epilepsy. History of meningitis in her 20s. Patient was a product of normal pregnancy and delivery. No history of febrile seizure or TBI.    CURRENT AED:  clonazepam 0.5mg TID - started mid-, now used for anxiety    PREVIOUS AEDs:  gabapentin, valproic acid - for mood, wt gain  levetiracetam - tapered off by Dr. Schwarz in 2021 after nonepileptic events captured on cvEEG    IMAGING:   MRI brain w/o 2019, w/wo 2019 ([facility]): A single 2 mm focus of increased FLAIR signal in the right parietal lobe does not demonstrate enhancement or evidence of acute infarct and is nonspecific.     NEUROPHYSIOLOGY:  Has had EEGs in the past; unremarkable per patient.    NEUROPSYCHOLOGY:   none    PMH:  DM, bipolar, depression, anxiety, migraine headache (follows [facility] neurology) and asthma    PSH:  No significant past surgical history    MEDICATION:  acetaminophen   Tablet .. 650 milliGRAM(s) Oral every 6 hours PRN Temp greater or equal to 38C (100.4F), Mild Pain (1 - 3), Moderate Pain (4 - 6)  ALBUTerol    0.083% 2.5 milliGRAM(s) Nebulizer every 6 hours PRN Shortness of Breath and/or Wheezing  ARIPiprazole 2 milliGRAM(s) Oral daily  clonazePAM  Tablet 0.5 milliGRAM(s) Oral three times a day  dextrose 40% Gel 15 Gram(s) Oral once  dextrose 5%. 1000 milliLiter(s) (50 mL/Hr) IV Continuous <Continuous>  dextrose 5%. 1000 milliLiter(s) (100 mL/Hr) IV Continuous <Continuous>  dextrose 50% Injectable 25 Gram(s) IV Push once  dextrose 50% Injectable 12.5 Gram(s) IV Push once  dextrose 50% Injectable 25 Gram(s) IV Push once  FLUoxetine 30 milliGRAM(s) Oral daily  folic acid 1 milliGRAM(s) Oral daily  glucagon  Injectable 1 milliGRAM(s) IntraMuscular once  insulin glargine Injectable (LANTUS) 5 Unit(s) SubCutaneous at bedtime  insulin lispro (ADMELOG) corrective regimen sliding scale   SubCutaneous three times a day before meals  insulin lispro (ADMELOG) corrective regimen sliding scale   SubCutaneous at bedtime  LORazepam   Injectable 1 milliGRAM(s) IV Push once PRN for seizure activity  LORazepam   Injectable 1 milliGRAM(s) IV Push every 1 hour PRN CIWA-Ar score 8 or greater  multivitamin 1 Tablet(s) Oral daily  ondansetron Injectable 4 milliGRAM(s) IV Push every 6 hours PRN Nausea and/or Vomiting  thiamine 100 milliGRAM(s) Oral daily    ALLERGIES:  Benadryl (Other (Severe))    FH:  Younger sister with epilepsy.  Twin B sister developed seizures after TBI; passed away from drug overdose.    SH:  Denied EtOH, tobacco, illicit drugs.    ROS:  Neurology as per HPI, otherwise negative for constitutional, skin, eyes, ENT, respiratory, cardiovascular, gastrointestinal, genitourinary, musculoskeletal, psychiatric, hematology/lymphatics, endocrine, allergic/immunologic.    VITALS:  T(C): 36.9 (21 @ 05:11), Max: 37.1 (21 @ 18:18)  HR: 106 (21 @ 05:11) (106 - 120)  BP: 134/84 (21 @ 05:11) (134/84 - 142/77)  ABP: --  RR: 18 (21 @ 05:11) (18 - 20)  SpO2: 99% (21 @ 05:11) (99% - 100%)  CVP(cm H2O): --    GENERAL PHYSICAL EXAM:  GEN: no distress  HEENT: NCAT, OP clear  EYES: sclera white, conjunctiva clear, no nystagmus  NECK: supple  CV: RRR, no murmur     		  PULM: CTAB, no wheezing  ABD: soft, +BS, NT  EXT: peripheral pulse intact, no cyanosis  MSK: muscle tone and strength normal  SKIN: warm, dry    NEUROLOGICAL EXAM:  Mental Status  Orientation: alert and oriented to person, place, time, and situation   Language: clear and fluent    Cranial Nerves  II: full visual fields intact   III, IV, VI: PERRL, EOMI  V, VII: facial sensation and movement intact and symmetric   VIII: hearing intact   IX, X: uvula midline, soft palate elevates normally   XI: BL shoulder shrug intact   XII: tongue midline    Motor  5/5 BUE and BLE                 Tone and bulk are normal in upper and lower limbs  No pronator drift    Sensation  Intact to light touch and pinprick in all 4 EXTs    Reflex  2+ in BL biceps and patella                                    Plantar responses downward bilaterally    Coordination  Normal FTN bilaterally    Gait  Deferred      LABS:                          14.9   8.81  )-----------( 280      ( 31 Aug 2021 19:43 )             43.3         135  |  102  |  12.1  ----------------------------<  117<H>  4.0   |  21.0<L>  |  0.47<L>    Ca    8.9      01 Sep 2021 09:08  Mg     2.1     -    TPro  7.4  /  Alb  4.5  /  TBili  <0.2<L>  /  DBili  x   /  AST  20  /  ALT  20  /  AlkPhos  72      CAPILLARY BLOOD GLUCOSE        LIVER FUNCTIONS - ( 31 Aug 2021 19:43 )  Alb: 4.5 g/dL / Pro: 7.4 g/dL / ALK PHOS: 72 U/L / ALT: 20 U/L / AST: 20 U/L / GGT: x             Urinalysis Basic - ( 31 Aug 2021 22:18 )    Color: Yellow / Appearance: Clear / S.010 / pH: x  Gluc: x / Ketone: Small  / Bili: Negative / Urobili: Negative mg/dL   Blood: x / Protein: Negative mg/dL / Nitrite: Negative   Leuk Esterase: Trace / RBC: 0-2 /HPF / WBC 3-5   Sq Epi: x / Non Sq Epi: Negative / Bacteria: Occasional        OTHER IMAGING AND STUDIES:    < from: CTH, CT Angio Head & Neck w/ IV Cont (21 @ 20:32) >  IMPRESSION:    CT angiography neck: No hemodynamically significant stenosis of the bilateral cervical ICAs using NASCET criteria.  Patent vertebral arteries.  No evidence of vascular dissection.    CT angiography brain:  1.  No large vessel occlusion. No evidence of aneurysm.  2.  Asymmetric prominent vasculature is noted in the anterior left frontal lobe, raising suspicion for a vascular anomaly. Consider further evaluation with catheter angiography.                                                                          [facility]                                                                     MD Zuly Day DO                                              Epilepsy Consult #: [phone] ([phone])                                               Office: [address] [place] [address], [place], [place], [address]                                                 Phone: [phone]; Fax: [phone]                            ==============================================    EPILEPSY INITIAL CONSULT NOTE      ADMITTING DIAGNOSIS: Convulsions        HPI:  This is a 36y RH Female with a history of DM, bipolar, depression, anxiety, migraine headache (follows [facility] neurology), asthma and nonepileptic events who was brought from [place] [address] for possible seizure.     Patient was gambling at [place] [address], frustrated that she lost money, so she had a vodka cranberry cocktail. Patient typically does not drink EtOH beverage. Thirty to 60m later, pt began to feel lightheaded, dizzy and a little zoned out. This ill feeling worsened to generalized weakness with "jello" legs and eventual loss of awareness. She remembers bits and pieces of herself lying on the floor and being brought to [facility] via ambulance. After arrival to ER, patient stated that she has history of seizures. Got levetiracetam 1gm and a total of lorazepam 4mg in ER.     Spoke with Dr. Schwarz from [facility]. Patient has had numerous inpatient admissions for seizure-like events at [facility]; all of them were nonepileptic per Dr. Schwarz. The latest admission was in , where episodes of whole body stiffness, left facial twitching and decreased responsiveness was captured without ictal correlate on EEG. He had tapered her off of levetiracetam after that admission.    Per Dr. Schwarz's note, on 2018, patient was brought to [facility] for convulsive activity at [place] [address]. Convulsion could be interrupted by stimulation. Normal cvEEG.     Patient underwent another cvEEG at [facility] on  - 18. Two nonepileptic events captured. One episode of unresponsiveness, right upper extremity extension and facial twitching was recorded without ictal correlate on EEG. The other episode consisted of seeing red and black spots, feeling like she was going to pass out, legs going numb, couldn't stand up was recorded without ictal correlate on EEG.     Since ~, patient has been having episodic right lower extremity cramping and arrhythmic twitching accompanied by visual hallucination (eg. lights, figures, bugs) in the right eye (not visual field). No impaired awareness or language difficulties during these episodes. Each episode lasts about 1-2m, occurring 1-2 times daily.     In mid-, also had a seizure-like event. Patient woke up from sleep, feeling she was still in a dream. She got out of the bed and tried to walk upstairs inside her house, and suddenly she became paralyzed from neck down. Her 4 extremities were contracts and stiff, and she couldn't move or speak. Mentation remains at baseline. This lasted for about 10m or so, gradually resolved inside ambulance en route to [facility]. She was started on clonazepam after this event.    Of note, due to initial presentation of AMS, patient underwent CTH and CTA H/N in ER, which showed "asymmetric prominent vasculature is noted in the anterior left frontal lobe, raising suspicion for a vascular anomaly." Imaging reviewed by the Neuro IR team and didn't think any further neur IR workup is needed. NSG also consulted, who recommended MRI brain and MRA head.    SEIZURE RISK FACTORS:  Younger sister with epilepsy. History of meningitis in her 20s. Patient was a product of normal pregnancy and delivery. No history of febrile seizure or TBI.    CURRENT AED:  clonazepam 0.5mg TID - started mid-, now used for anxiety    PREVIOUS AEDs:  gabapentin, valproic acid - for mood, wt gain  levetiracetam - tapered off by Dr. Schwarz in 2021 after nonepileptic events captured on cvEEG  lacosamide     IMAGING:   MRI brain w/o 2019, w/wo 2019 ([facility]): A single 2 mm focus of increased FLAIR signal in the right parietal lobe does not demonstrate enhancement or evidence of acute infarct and is nonspecific.     NEUROPHYSIOLOGY:  Numerous cvEEGs at [facility] were normal per Dr. Schwarz's note.     cvEEG at [facility] in , where episodes of whole body stiffness, left facial twitching and decreased responsiveness was captured without ictal correlate on EEG.    cvEEG at [facility] on  - 18. Two nonepileptic events captured. One episode of unresponsiveness, right upper extremity extension and facial twitching was recorded without ictal correlate on EEG. The other episode consisted of seeing red and black spots, feeling like she was going to pass out, legs going numb, couldn't stand up was recorded without ictal correlate on EEG.     NEUROPSYCHOLOGY:   none    PMH:  DM, bipolar, depression, anxiety, migraine headache (follows [facility] neurology) and asthma    PSH:  No significant past surgical history    MEDICATION:  acetaminophen   Tablet .. 650 milliGRAM(s) Oral every 6 hours PRN Temp greater or equal to 38C (100.4F), Mild Pain (1 - 3), Moderate Pain (4 - 6)  ALBUTerol    0.083% 2.5 milliGRAM(s) Nebulizer every 6 hours PRN Shortness of Breath and/or Wheezing  ARIPiprazole 2 milliGRAM(s) Oral daily  clonazePAM  Tablet 0.5 milliGRAM(s) Oral three times a day  dextrose 40% Gel 15 Gram(s) Oral once  dextrose 5%. 1000 milliLiter(s) (50 mL/Hr) IV Continuous <Continuous>  dextrose 5%. 1000 milliLiter(s) (100 mL/Hr) IV Continuous <Continuous>  dextrose 50% Injectable 25 Gram(s) IV Push once  dextrose 50% Injectable 12.5 Gram(s) IV Push once  dextrose 50% Injectable 25 Gram(s) IV Push once  FLUoxetine 30 milliGRAM(s) Oral daily  folic acid 1 milliGRAM(s) Oral daily  glucagon  Injectable 1 milliGRAM(s) IntraMuscular once  insulin glargine Injectable (LANTUS) 5 Unit(s) SubCutaneous at bedtime  insulin lispro (ADMELOG) corrective regimen sliding scale   SubCutaneous three times a day before meals  insulin lispro (ADMELOG) corrective regimen sliding scale   SubCutaneous at bedtime  LORazepam   Injectable 1 milliGRAM(s) IV Push once PRN for seizure activity  LORazepam   Injectable 1 milliGRAM(s) IV Push every 1 hour PRN CIWA-Ar score 8 or greater  multivitamin 1 Tablet(s) Oral daily  ondansetron Injectable 4 milliGRAM(s) IV Push every 6 hours PRN Nausea and/or Vomiting  thiamine 100 milliGRAM(s) Oral daily    ALLERGIES:  Benadryl (Other (Severe))  Augmentin (rash)    FH:  Younger sister with epilepsy.  Twin B sister developed seizures after TBI; passed away from drug overdose.    SH:  Denied EtOH, tobacco, illicit drugs.    ROS:  Neurology as per HPI, otherwise negative for constitutional, skin, eyes, ENT, respiratory, cardiovascular, gastrointestinal, genitourinary, musculoskeletal, psychiatric, hematology/lymphatics, endocrine, allergic/immunologic.    VITALS:  T(C): 36.9 (21 @ 05:11), Max: 37.1 (21 @ 18:18)  HR: 106 (21 @ 05:11) (106 - 120)  BP: 134/84 (21 @ 05:11) (134/84 - 142/77)  ABP: --  RR: 18 (21 @ 05:11) (18 - 20)  SpO2: 99% (21 @ 05:11) (99% - 100%)  CVP(cm H2O): --    GENERAL PHYSICAL EXAM:  GEN: no distress  HEENT: NCAT, OP clear  EYES: sclera white, conjunctiva clear, no nystagmus  NECK: supple  CV: RRR, no murmur     		  PULM: CTAB, no wheezing  ABD: soft, +BS, NT  EXT: peripheral pulse intact, no cyanosis  MSK: muscle tone and strength normal  SKIN: warm, dry    NEUROLOGICAL EXAM:  Mental Status  Orientation: alert and oriented to person, place, time, and situation   Language: clear and fluent    Cranial Nerves  II: full visual fields intact   III, IV, VI: PERRL, EOMI  V, VII: facial sensation and movement intact and symmetric   VIII: hearing intact   IX, X: uvula midline, soft palate elevates normally   XI: BL shoulder shrug intact   XII: tongue midline    Motor  5/5 BUE and BLE                 Tone and bulk are normal in upper and lower limbs  No pronator drift    Sensation  Intact to light touch and pinprick in all 4 EXTs    Reflex  2+ in BL biceps and patella                                    Plantar responses downward bilaterally    Coordination  Normal FTN bilaterally    Gait  Deferred      LABS:                          14.9   8.81  )-----------( 280      ( 31 Aug 2021 19:43 )             43.3         135  |  102  |  12.1  ----------------------------<  117<H>  4.0   |  21.0<L>  |  0.47<L>    Ca    8.9      01 Sep 2021 09:08  Mg     2.1         TPro  7.4  /  Alb  4.5  /  TBili  <0.2<L>  /  DBili  x   /  AST  20  /  ALT  20  /  AlkPhos  72      CAPILLARY BLOOD GLUCOSE        LIVER FUNCTIONS - ( 31 Aug 2021 19:43 )  Alb: 4.5 g/dL / Pro: 7.4 g/dL / ALK PHOS: 72 U/L / ALT: 20 U/L / AST: 20 U/L / GGT: x             Urinalysis Basic - ( 31 Aug 2021 22:18 )    Color: Yellow / Appearance: Clear / S.010 / pH: x  Gluc: x / Ketone: Small  / Bili: Negative / Urobili: Negative mg/dL   Blood: x / Protein: Negative mg/dL / Nitrite: Negative   Leuk Esterase: Trace / RBC: 0-2 /HPF / WBC 3-5   Sq Epi: x / Non Sq Epi: Negative / Bacteria: Occasional        OTHER IMAGING AND STUDIES:    < from: CTH, CT Angio Head & Neck w/ IV Cont (21 @ 20:32) >  IMPRESSION:    CT angiography neck: No hemodynamically significant stenosis of the bilateral cervical ICAs using NASCET criteria.  Patent vertebral arteries.  No evidence of vascular dissection.    CT angiography brain:  1.  No large vessel occlusion. No evidence of aneurysm.  2.  Asymmetric prominent vasculature is noted in the anterior left frontal lobe, raising suspicion for a vascular anomaly. Consider further evaluation with catheter angiography.     done

## 2021-09-01 NOTE — CONSULT NOTE ADULT - CONSULT REASON
seizure-like activity
"Asymmetric prominent vasculature is noted in the anterior left frontal lobe, raising suspicion for a vascular anomaly. Consider further evaluation with catheter angiography."
Asymmetric prominent vasculature is noted in the anterior left frontal lobe, raising suspicion for a vascular anomaly.

## 2021-09-02 ENCOUNTER — TRANSCRIPTION ENCOUNTER (OUTPATIENT)
Age: 36
End: 2021-09-02

## 2021-09-02 VITALS
TEMPERATURE: 98 F | DIASTOLIC BLOOD PRESSURE: 69 MMHG | SYSTOLIC BLOOD PRESSURE: 111 MMHG | RESPIRATION RATE: 18 BRPM | HEART RATE: 92 BPM | OXYGEN SATURATION: 95 %

## 2021-09-02 PROBLEM — E11.9 TYPE 2 DIABETES MELLITUS WITHOUT COMPLICATIONS: Chronic | Status: ACTIVE | Noted: 2021-09-01

## 2021-09-02 PROBLEM — F31.9 BIPOLAR DISORDER, UNSPECIFIED: Chronic | Status: ACTIVE | Noted: 2021-09-01

## 2021-09-02 PROBLEM — Z87.898 PERSONAL HISTORY OF OTHER SPECIFIED CONDITIONS: Chronic | Status: ACTIVE | Noted: 2021-09-01

## 2021-09-02 LAB
A1C WITH ESTIMATED AVERAGE GLUCOSE RESULT: 10.4 % — HIGH (ref 4–5.6)
COVID-19 SPIKE DOMAIN AB INTERP: POSITIVE
COVID-19 SPIKE DOMAIN ANTIBODY RESULT: >250 U/ML — HIGH
CULTURE RESULTS: SIGNIFICANT CHANGE UP
ESTIMATED AVERAGE GLUCOSE: 252 MG/DL — HIGH (ref 68–114)
GLUCOSE BLDC GLUCOMTR-MCNC: 100 MG/DL — HIGH (ref 70–99)
GLUCOSE BLDC GLUCOMTR-MCNC: 104 MG/DL — HIGH (ref 70–99)
GLUCOSE BLDC GLUCOMTR-MCNC: 116 MG/DL — HIGH (ref 70–99)
GLUCOSE BLDC GLUCOMTR-MCNC: 169 MG/DL — HIGH (ref 70–99)
SARS-COV-2 IGG+IGM SERPL QL IA: >250 U/ML — HIGH
SARS-COV-2 IGG+IGM SERPL QL IA: POSITIVE
SPECIMEN SOURCE: SIGNIFICANT CHANGE UP

## 2021-09-02 PROCEDURE — 99239 HOSP IP/OBS DSCHRG MGMT >30: CPT

## 2021-09-02 PROCEDURE — 70544 MR ANGIOGRAPHY HEAD W/O DYE: CPT

## 2021-09-02 PROCEDURE — 80053 COMPREHEN METABOLIC PANEL: CPT

## 2021-09-02 PROCEDURE — 85018 HEMOGLOBIN: CPT

## 2021-09-02 PROCEDURE — 83036 HEMOGLOBIN GLYCOSYLATED A1C: CPT

## 2021-09-02 PROCEDURE — 81001 URINALYSIS AUTO W/SCOPE: CPT

## 2021-09-02 PROCEDURE — U0003: CPT

## 2021-09-02 PROCEDURE — 87086 URINE CULTURE/COLONY COUNT: CPT

## 2021-09-02 PROCEDURE — 99285 EMERGENCY DEPT VISIT HI MDM: CPT | Mod: 25

## 2021-09-02 PROCEDURE — 95700 EEG CONT REC W/VID EEG TECH: CPT

## 2021-09-02 PROCEDURE — 95718 EEG PHYS/QHP 2-12 HR W/VEEG: CPT

## 2021-09-02 PROCEDURE — 70496 CT ANGIOGRAPHY HEAD: CPT | Mod: MA

## 2021-09-02 PROCEDURE — 80048 BASIC METABOLIC PNL TOTAL CA: CPT

## 2021-09-02 PROCEDURE — 82435 ASSAY OF BLOOD CHLORIDE: CPT

## 2021-09-02 PROCEDURE — 96374 THER/PROPH/DIAG INJ IV PUSH: CPT

## 2021-09-02 PROCEDURE — 99233 SBSQ HOSP IP/OBS HIGH 50: CPT

## 2021-09-02 PROCEDURE — 36415 COLL VENOUS BLD VENIPUNCTURE: CPT

## 2021-09-02 PROCEDURE — 84702 CHORIONIC GONADOTROPIN TEST: CPT

## 2021-09-02 PROCEDURE — 70450 CT HEAD/BRAIN W/O DYE: CPT | Mod: MA

## 2021-09-02 PROCEDURE — U0005: CPT

## 2021-09-02 PROCEDURE — 70553 MRI BRAIN STEM W/O & W/DYE: CPT

## 2021-09-02 PROCEDURE — 84132 ASSAY OF SERUM POTASSIUM: CPT

## 2021-09-02 PROCEDURE — 70498 CT ANGIOGRAPHY NECK: CPT | Mod: MA

## 2021-09-02 PROCEDURE — 83605 ASSAY OF LACTIC ACID: CPT

## 2021-09-02 PROCEDURE — 83735 ASSAY OF MAGNESIUM: CPT

## 2021-09-02 PROCEDURE — 86769 SARS-COV-2 COVID-19 ANTIBODY: CPT

## 2021-09-02 PROCEDURE — 80307 DRUG TEST PRSMV CHEM ANLYZR: CPT

## 2021-09-02 PROCEDURE — 85014 HEMATOCRIT: CPT

## 2021-09-02 PROCEDURE — 93005 ELECTROCARDIOGRAM TRACING: CPT

## 2021-09-02 PROCEDURE — 95714 VEEG EA 12-26 HR UNMNTR: CPT

## 2021-09-02 PROCEDURE — 96375 TX/PRO/DX INJ NEW DRUG ADDON: CPT

## 2021-09-02 PROCEDURE — 85025 COMPLETE CBC W/AUTO DIFF WBC: CPT

## 2021-09-02 PROCEDURE — 82330 ASSAY OF CALCIUM: CPT

## 2021-09-02 PROCEDURE — 71045 X-RAY EXAM CHEST 1 VIEW: CPT

## 2021-09-02 PROCEDURE — 82803 BLOOD GASES ANY COMBINATION: CPT

## 2021-09-02 PROCEDURE — 82962 GLUCOSE BLOOD TEST: CPT

## 2021-09-02 PROCEDURE — 82947 ASSAY GLUCOSE BLOOD QUANT: CPT

## 2021-09-02 PROCEDURE — 84295 ASSAY OF SERUM SODIUM: CPT

## 2021-09-02 RX ADMIN — Medication 0.5 MILLIGRAM(S): at 06:18

## 2021-09-02 NOTE — DISCHARGE NOTE PROVIDER - CARE PROVIDER_API CALL
Ar Mac)  EEGEpilepsy; Neurology  [address] [place] [address]  Phone: [phone]  Fax: [phone]  Follow Up Time: 1 week    Primary Care Provider,   Phone: (   )    -  Fax: (   )    -  Follow Up Time: 1 week   Ar Mac)  EEGEpilepsy; Neurology  [address] [place] [address]  Phone: [phone]  Fax: [phone]  Follow Up Time: 1 week    Primary Care Provider,   Phone: (   )    -  Fax: (   )    -  Follow Up Time: 1 week    Reza Cruz; PhD)  Neurology; Vascular Neurology  [address], [address]  [place] [address]  Phone: [phone]  Fax: [phone]  Follow Up Time: 1 week

## 2021-09-02 NOTE — PROGRESS NOTE ADULT - SUBJECTIVE AND OBJECTIVE BOX
[facility]                                                                     MD Zuly Day DO                                              Epilepsy Consult #: [phone] ([phone])                                               Office: [address] [place] [address], [address]                                                 Phone: [phone]; Fax: [phone]                            ==============================================    EPILEPSY FOLLOW-UP NOTE      INTERVAL HISTORY:  Habitual events recorded without ictal correlate on EEG. MRI brain and MRA head unremarkable.    MEDICATIONS:   acetaminophen   Tablet .. 650 milliGRAM(s) Oral every 6 hours PRN Temp greater or equal to 38C (100.4F), Mild Pain (1 - 3), Moderate Pain (4 - 6)  ALBUTerol    0.083% 2.5 milliGRAM(s) Nebulizer every 6 hours PRN Shortness of Breath and/or Wheezing  ARIPiprazole 2 milliGRAM(s) Oral daily  clonazePAM  Tablet 0.5 milliGRAM(s) Oral three times a day  dextrose 40% Gel 15 Gram(s) Oral once  dextrose 5%. 1000 milliLiter(s) (50 mL/Hr) IV Continuous <Continuous>  dextrose 5%. 1000 milliLiter(s) (100 mL/Hr) IV Continuous <Continuous>  dextrose 50% Injectable 25 Gram(s) IV Push once  dextrose 50% Injectable 12.5 Gram(s) IV Push once  dextrose 50% Injectable 25 Gram(s) IV Push once  FLUoxetine 30 milliGRAM(s) Oral daily  folic acid 1 milliGRAM(s) Oral daily  glucagon  Injectable 1 milliGRAM(s) IntraMuscular once  influenza   Vaccine 0.5 milliLiter(s) IntraMuscular once  insulin glargine Injectable (LANTUS) 5 Unit(s) SubCutaneous at bedtime  insulin lispro (ADMELOG) corrective regimen sliding scale   SubCutaneous three times a day before meals  insulin lispro (ADMELOG) corrective regimen sliding scale   SubCutaneous at bedtime  LORazepam   Injectable 1 milliGRAM(s) IV Push once PRN for seizure activity  LORazepam   Injectable 1 milliGRAM(s) IV Push every 1 hour PRN CIWA-Ar score 8 or greater  multivitamin 1 Tablet(s) Oral daily  ondansetron Injectable 4 milliGRAM(s) IV Push every 6 hours PRN Nausea and/or Vomiting  thiamine 100 milliGRAM(s) Oral daily    LAST 24-HR VITALS:  T(C): 36.7 (21 @ 10:37), Max: 36.7 (21 @ 10:37)  HR: 92 (21 @ 10:37) (92 - 94)  BP: 111/69 (21 @ 10:37) (111/69 - 113/84)  ABP: --  RR: 18 (21 @ 10:37) (18 - 18)  SpO2: 95% (21 @ 10:37) (95% - 95%)  CVP(cm H2O): --    GENERAL PHYSICAL EXAM:  GEN: no distress   HEENT: NCAT, OP clear  EYES: sclera white, conjunctiva clear, no nystagmus  NECK: supple  CV: RRR, no murmur     		  PULM: CTAB, no wheezing  ABD: soft, +BS, NT  EXT: peripheral pulse intact, no cyanosis  MSK: muscle tone and strength normal  SKIN: warm, dry    NEUROLOGICAL EXAM:  Mental Status  Orientation: alert and oriented to person, place, time, and situation   Language: clear and fluent    Cranial Nerves  II: full visual fields intact   III, IV, VI: PERRL, EOMI  V, VII: facial sensation and movement intact and symmetric   VIII: hearing intact   IX, X: uvula midline, soft palate elevates normally   XI: BL shoulder shrug intact   XII: tongue midline    Motor  5/5 BUE and BLE                 Tone and bulk are normal in upper and lower limbs  No pronator drift    Sensation  Intact to light touch and pinprick in all 4 EXTs    Reflex  2+ in BL biceps and patella                                    Plantar responses downward bilaterally    Coordination  Normal FTN bilaterally    Gait  Deferred       LABS:                          14.9   8.81  )-----------( 280      ( 31 Aug 2021 19:43 )             43.3         135  |  102  |  12.1  ----------------------------<  117<H>  4.0   |  21.0<L>  |  0.47<L>    Ca    8.9      01 Sep 2021 09:08  Mg     2.1         TPro  7.4  /  Alb  4.5  /  TBili  <0.2<L>  /  DBili  x   /  AST  20  /  ALT  20  /  AlkPhos  72      CAPILLARY BLOOD GLUCOSE      POCT Blood Glucose.: 169 mg/dL (02 Sep 2021 11:04)  POCT Blood Glucose.: 100 mg/dL (02 Sep 2021 07:40)  POCT Blood Glucose.: 116 mg/dL (01 Sep 2021 22:08)  POCT Blood Glucose.: 141 mg/dL (01 Sep 2021 18:57)  POCT Blood Glucose.: 128 mg/dL (01 Sep 2021 16:38)  POCT Blood Glucose.: 104 mg/dL (01 Sep 2021 12:19)    LIVER FUNCTIONS - ( 31 Aug 2021 19:43 )  Alb: 4.5 g/dL / Pro: 7.4 g/dL / ALK PHOS: 72 U/L / ALT: 20 U/L / AST: 20 U/L / GGT: x             Urinalysis Basic - ( 31 Aug 2021 22:18 )    Color: Yellow / Appearance: Clear / S.010 / pH: x  Gluc: x / Ketone: Small  / Bili: Negative / Urobili: Negative mg/dL   Blood: x / Protein: Negative mg/dL / Nitrite: Negative   Leuk Esterase: Trace / RBC: 0-2 /HPF / WBC 3-5   Sq Epi: x / Non Sq Epi: Negative / Bacteria: Occasional        OTHER IMAGING AND STUDIES:    non-elective EMU - 21:  EEG Summary:  Normal EEG in the awake, drowsy and asleep states.  - Three events of interest recorded without ictal correlate on EEG.  - Diffuse excess beta activity.    Impression/Clinical Correlate:   – : Three habitual events recorded.    During this EMU admission, 3 episodes of RLE cramping recorded without ictal correlate on EEG. Otherwise normal EEG, except for diffuse excess beta activity, which may be seen with medication use such as benzodiazepines or barbiturates.        < from: MR Head w/wo IV Cont (21 @ 15:38) >  IMPRESSION:    1)  Unremarkable pre and postcontrast MRI study of the brain..  2)  sinuses obscured by artifact from dental work..      < from: MR Angio Head No Cont (21 @ 15:30) >  IMPRESSION:  Unremarkable study.        
Patient was seen and examined at bedside.   Complaining of headache associated with photophobia.   Denies dizziness, paresthesia, arm or leg weakness.   Denies nausea, vomiting, abdominal / chest pain, palpitations or shortness of breath.    PHYSICAL EXAM:  Vital Signs   T(C): 36.9 (01 Sep 2021 05:11), Max: 37.1 (31 Aug 2021 18:18)  T(F): 98.4 (01 Sep 2021 05:11), Max: 98.8 (31 Aug 2021 18:18)  HR: 106 (01 Sep 2021 05:11) (106 - 120)  BP: 134/84 (01 Sep 2021 05:11) (134/84 - 142/77)  RR: 18 (01 Sep 2021 05:11) (18 - 20)  SpO2: 99% (01 Sep 2021 05:11) (99% - 100%)  General: Well developed. Well nourished. No acute distress  HEENT: PERRLA. EOMI. Clear conjunctivae. Moist mucus membrane  Neck: Supple.   Chest: CTA bilaterally - no wheezing, rales or rhonchi.  Heart: Normal S1 & S2 with RRR. No murmur.   Abdomen: Soft. Non-tender. Non-distended. + BS  Ext: No pedal edema. No calf tenderness   Neuro: AAO x 3. No focal deficit. No speech disorder. No meningeal signs.   Skin: Warm and Dry  Psychiatry: Normal mood and affect    Labs and Radiology reviewed.    Plan:  Refused MRI / MRA in morning, now agreeable. New orders placed.   She needs EEG. Transfer to EMU on 6 Tower.   Replete Potassium.  Tylenol PRN.    Please refer to H&P from earlier today for more details.

## 2021-09-02 NOTE — DISCHARGE NOTE PROVIDER - NSDCMRMEDTOKEN_GEN_ALL_CORE_FT
Abilify 2 mg oral tablet: 1 tab(s) orally once a day  Breo Ellipta 100 mcg-25 mcg/inh inhalation powder: 1 puff(s) inhaled once a day  clonazePAM 0.5 mg oral tablet: 1 tab(s) orally 3 times a day  ergocalciferol 1.25 mg (50,000 intl units) oral capsule: 1 cap(s) orally once a week  FLUoxetine 20 mg oral capsule: 1 cap(s) orally once a day  folic acid 1 mg oral tablet: 1 tab(s) orally once a day  Jardiance 25 mg oral tablet: 1 tab(s) orally once a day (in the morning)  norethindrone 5 mg oral tablet: 1 tab(s) orally once a day  NovoLOG 100 units/mL subcutaneous solution: 8 unit(s) subcutaneous 3 times a day  thiamine 100 mg oral tablet: 1 tab(s) orally once a day  traZODone 100 mg oral tablet: 1 tab(s) orally once a day (at bedtime)  Tresiba 100 units/mL subcutaneous solution: 30 unit(s) subcutaneous once a day

## 2021-09-02 NOTE — DISCHARGE NOTE PROVIDER - CARE PROVIDERS DIRECT ADDRESSES
,[email or web address],DirectAddress_Unknown ,[email or web address],DirectAddress_Unknown,[email or web address]

## 2021-09-02 NOTE — DISCHARGE NOTE PROVIDER - PROVIDER TOKENS
PROVIDER:[TOKEN:[[id number]],FOLLOWUP:[1 week]],FREE:[LAST:[Primary Care Provider],PHONE:[(   )    -],FAX:[(   )    -],FOLLOWUP:[1 week]] PROVIDER:[TOKEN:[[id number]],FOLLOWUP:[1 week]],FREE:[LAST:[Primary Care Provider],PHONE:[(   )    -],FAX:[(   )    -],FOLLOWUP:[1 week]],PROVIDER:[TOKEN:[[id number]],FOLLOWUP:[1 week]]

## 2021-09-02 NOTE — PROGRESS NOTE ADULT - ASSESSMENT
37yo RH Female with a history of DM, bipolar, depression, anxiety, migraine headache (follows [facility] neurology) asthma and nonepileptic events who was brought from [place] [address] for possible seizure. Personally reviewed all imagines, EEG, labs and other studies.    Impression:  1. Initial presentation of AMS, generalized weakness, dizziness: Had vodka cocktail, when normally does not drink EtOH beverage. Presentation suggests EtOH intoxication.  2. Seizure-like activity: Has a prior history of nonepileptic events. Events of interest recorded on cvEEG during this hospital admission consistent with nonepileptic events.  3. DM, bipolar, depression, anxiety, migraine headache, asthma      Recommendation:  - discontinue cvEEG  - no indication for antiepileptic medication at this time  - continue clonazepam 0.5mg TID for anxiety  - management of other medical problems per primary team    - follow-up with established neurologist at [facility]       No acute intervention from Epilepsy at this time.  Please reconsult if needed.  ______________________  Ar Mac MD   Director, Epilepsy/EMU - [facility]   Epilepsy Consult #: [phone] ([phone])

## 2021-09-02 NOTE — DISCHARGE NOTE PROVIDER - HOSPITAL COURSE
37 y/o female with h/o seizure disorder (non compliant with meds as per pt. last use of seizure medicine was long time ago ) , DM, bipolar disorder, asthma [place] in the area, was drinking alcohol and had a seizure episode and was brought to the ER. Pt. stated that she had another seizure 2 weeks as well but did not come to the hospital. Pt admitted to [facility] for seizure disorder. Pt was placed on continuous vEEG with no significant findings. Pt continued on home clonazepam. Pt also underwent MRI/MRA of brain which was negative. Pt was advised to f/u with EP in one week.   Pt has h/o of DM and was continued on home meds. Pt also has a h/o of bipolar disorder and was continued on home meds.  Pt is now medically stable for discharge.             37 y/o female with h/o seizure disorder (non compliant with meds as per pt. last use of seizure medicine was long time ago), DM, bipolar disorder, asthma was at a casino in the area, was drinking alcohol and had a seizure episode and was brought to the ER. Pt. stated that she had another seizure 2 weeks as well but did not come to the hospital. Pt admitted to [facility] for seizure disorder. Pt was placed on continuous vEEG with no significant findings. Pt continued on home clonazepam. Pt also underwent MRI/MRA of brain which was negative. Pt was advised to f/u with EP in one week.   Pt has h/o of DM and was continued on home meds. Pt also has a h/o of bipolar disorder and was continued on home meds.  Pt is now medically stable for discharge.             37 y/o female with h/o seizure disorder (non compliant with meds as per pt. last use of seizure medicine was long time ago), DM, bipolar disorder, asthma was at a casino in the area, was drinking alcohol and had a seizure episode and was brought to the ER. Pt. stated that she had another seizure 2 weeks as well but did not come to the hospital. Pt admitted to [facility] for seizure disorder. Pt was placed on continuous vEEG with no significant findings. Pt continued on home clonazepam. Pt also underwent MRI/MRA of brain which was negative. Pt was advised to f/u with EP in one week.   Pt has h/o of DM and was continued on home meds. Pt also has a h/o of bipolar disorder and was continued on home meds. Pt has h/o migranes. F/u with neurology in one week.  Pt is now medically stable for discharge.             37 y/o female with h/o seizure disorder (non compliant with meds as per pt. last use of seizure medicine was long time ago), DM, bipolar disorder, asthma was at a casino in the area, was drinking alcohol and had a seizure episode and was brought to the ER. Pt. stated that she had another seizure 2 weeks as well but did not come to the hospital. Pt admitted to [facility] for seizure disorder. Pt was placed on continuous vEEG with no significant findings. Pt continued on home clonazepam. Pt also underwent MRI/MRA of brain which was negative. Pt was advised to f/u with EP in one week. Pt has h/o of DM and was continued on home meds. Pt also has a h/o of bipolar disorder and was continued on home meds. Pt has h/o migranes. F/u with neurology in one week. Pt is now medically stable for discharge.     All electrolyte abnormalities were monitored carefully and repleted as necessary during this hospitalization. At the time of discharge patient was hemodynamically stable and amenable to all terms of discharge. The patient has received verbal instructions from myself regarding discharge plans.     Length of Discharge: 45MIN    Vital Signs Last 24 Hrs  T(C): 36.7 (02 Sep 2021 10:37), Max: 36.7 (02 Sep 2021 10:37)  T(F): 98.1 (02 Sep 2021 10:37), Max: 98.1 (02 Sep 2021 10:37)  HR: 92 (02 Sep 2021 10:37) (92 - 94)  BP: 111/69 (02 Sep 2021 10:37) (111/69 - 113/84)  BP(mean): --  RR: 18 (02 Sep 2021 10:37) (18 - 18)  SpO2: 95% (02 Sep 2021 10:37) (95% - 95%)    PHYSICAL EXAM:  GENERAL: Pt lying in bed comfortably in NAD  HEAD:  Atraumatic   CHEST/LUNG: Clear to auscultation bilaterally; Unlabored respirations  HEART: Regular rate and rhythm  ABDOMEN: Bowel sounds present; Soft, Nontender, Nondistended  EXTREMITIES:  No LE edema   NERVOUS SYSTEM:  Alert & Oriented X3, speech clear. Answers questions appropriately.  SKIN: Warm and dry             35 y/o female with h/o seizure disorder (non compliant with meds as per pt. last use of seizure medicine was long time ago), DM, bipolar disorder, asthma was at a casino in the area, was drinking alcohol and had a seizure episode and was brought to the ER. Pt. stated that she had another seizure 2 weeks as well but did not come to the hospital. Pt admitted to [facility] for seizure disorder. Pt was placed on continuous vEEG with no significant findings. Pt continued on home clonazepam. Pt also underwent MRI/MRA of brain which was negative. Pt was advised to f/u with EP in one week. Pt has h/o of DM and was continued on home meds. Pt also has a h/o of bipolar disorder and was continued on home meds. Pt has h/o migranes. F/u with neurology in one week. Pt is now medically stable for discharge.     All electrolyte abnormalities were monitored carefully and repleted as necessary during this hospitalization. At the time of discharge patient was hemodynamically stable and amenable to all terms of discharge. The patient has received verbal instructions from myself regarding discharge plans.     Length of Discharge: 45MIN          35 y/o female with h/o seizure disorder (non compliant with meds as per pt. last use of seizure medicine was long time ago), DM, bipolar disorder, asthma was at a casino in the area, was drinking alcohol and had a seizure episode and was brought to the ER. Pt. stated that she had another seizure 2 weeks as well but did not come to the hospital. Pt admitted to [facility] for seizure disorder. Pt was placed on continuous vEEG with no significant findings. Pt continued on home clonazepam. Pt also underwent MRI/MRA of brain which was negative. Pt was advised to f/u with EP in one week. Pt has h/o of DM and was continued on home meds. Pt also has a h/o of bipolar disorder and was continued on home meds. Pt has h/o migranes. F/u with neurology in one week. Pt is now medically stable for discharge.     All electrolyte abnormalities were monitored carefully and repleted as necessary during this hospitalization. At the time of discharge patient was hemodynamically stable and amenable to all terms of discharge. The patient has received verbal instructions from myself regarding discharge plans.     Length of Discharge: 45MIN    INTERVAL HPI/OVERNIGHT EVENTS: Patient seen and examined, no acute complaints ovenright. Denies headache. EEG negative.     Vital Signs Last 24 Hrs  T(C): 36.7 (02 Sep 2021 10:37), Max: 36.7 (02 Sep 2021 10:37)  T(F): 98.1 (02 Sep 2021 10:37), Max: 98.1 (02 Sep 2021 10:37)  HR: 92 (02 Sep 2021 10:37) (92 - 94)  BP: 111/69 (02 Sep 2021 10:37) (111/69 - 113/84)  BP(mean): --  RR: 18 (02 Sep 2021 10:37) (18 - 18)  SpO2: 95% (02 Sep 2021 10:37) (95% - 95%)    PHYSICAL EXAM:    GENERAL: NAD, AOX3  HEAD:  Atraumatic, Normocephalic  EYES: conjunctiva and sclera clear  ENMT: Moist mucous membranes  NECK: Supple, No JVD  CHEST/LUNG: Clear to auscultation bilaterally; No rales, rhonchi, wheezing, or rubs  HEART: Regular rate and rhythm; No murmurs, rubs, or gallops  ABDOMEN: Soft, Nontender, Nondistended; Bowel sounds present  EXTREMITIES:  2+ Peripheral Pulses, No clubbing, cyanosis, or edema        MEDICATIONS  (STANDING):  ARIPiprazole 2 milliGRAM(s) Oral daily  clonazePAM  Tablet 0.5 milliGRAM(s) Oral three times a day  dextrose 40% Gel 15 Gram(s) Oral once  dextrose 5%. 1000 milliLiter(s) (50 mL/Hr) IV Continuous <Continuous>  dextrose 5%. 1000 milliLiter(s) (100 mL/Hr) IV Continuous <Continuous>  dextrose 50% Injectable 25 Gram(s) IV Push once  dextrose 50% Injectable 12.5 Gram(s) IV Push once  dextrose 50% Injectable 25 Gram(s) IV Push once  FLUoxetine 30 milliGRAM(s) Oral daily  folic acid 1 milliGRAM(s) Oral daily  glucagon  Injectable 1 milliGRAM(s) IntraMuscular once  influenza   Vaccine 0.5 milliLiter(s) IntraMuscular once  insulin glargine Injectable (LANTUS) 5 Unit(s) SubCutaneous at bedtime  insulin lispro (ADMELOG) corrective regimen sliding scale   SubCutaneous three times a day before meals  insulin lispro (ADMELOG) corrective regimen sliding scale   SubCutaneous at bedtime  multivitamin 1 Tablet(s) Oral daily  thiamine 100 milliGRAM(s) Oral daily    MEDICATIONS  (PRN):  acetaminophen   Tablet .. 650 milliGRAM(s) Oral every 6 hours PRN Temp greater or equal to 38C (100.4F), Mild Pain (1 - 3), Moderate Pain (4 - 6)  ALBUTerol    0.083% 2.5 milliGRAM(s) Nebulizer every 6 hours PRN Shortness of Breath and/or Wheezing  LORazepam   Injectable 1 milliGRAM(s) IV Push once PRN for seizure activity  LORazepam   Injectable 1 milliGRAM(s) IV Push every 1 hour PRN CIWA-Ar score 8 or greater  ondansetron Injectable 4 milliGRAM(s) IV Push every 6 hours PRN Nausea and/or Vomiting      Allergies    Benadryl (Other (Severe))    Intolerances          LABS:                          14.9   8.81  )-----------( 280      ( 31 Aug 2021 19:43 )             43.3     09-01    135  |  102  |  12.1  ----------------------------<  117<H>  4.0   |  21.0<L>  |  0.47<L>    Ca    8.9      01 Sep 2021 09:08  Mg     2.1         TPro  7.4  /  Alb  4.5  /  TBili  <0.2<L>  /  DBili  x   /  AST  20  /  ALT  20  /  AlkPhos  72        Urinalysis Basic - ( 31 Aug 2021 22:18 )    Color: Yellow / Appearance: Clear / S.010 / pH: x  Gluc: x / Ketone: Small  / Bili: Negative / Urobili: Negative mg/dL   Blood: x / Protein: Negative mg/dL / Nitrite: Negative   Leuk Esterase: Trace / RBC: 0-2 /HPF / WBC 3-5   Sq Epi: x / Non Sq Epi: Negative / Bacteria: Occasional

## 2021-09-02 NOTE — EEG REPORT - NS EEG TEXT BOX
[facility]  Epilepsy Monitoring Unit Report    [facility]: [address], [place] [address], Phone [phone]  [facility]: [address] [place], [place] [address], [place] [address], Phone [phone]  [place] [facility]: [address], [address], [place] [address] Phone [phone]    [facility]: [address] [place] [address], Phone [phone]  [place] [facility]: [address] [place] [address], Phone [phone]    Patient Name: Anna Villarreal    Age: 36 year, : 1985  Patient ID: -, MRN #: -, Sebastian: -    Physician Ordering Inpatient EEG: Ar Mac  Referral Source to EMU: non-elective admission – from ER    EMU Study Started: 17:25 on 21    EMU Study Ended: pending discharge    Study Information:    EEG Recording Technique:  The patient underwent continuous Video-EEG monitoring, using Telemetry System hardware on the XLTek Digital System. EEG and video data were stored on a computer hard drive with important events saved in digital archive files. The material was reviewed by a physician (electroencephalographer / epileptologist) on a daily basis. Stalin and seizure detection algorithms were utilized and reviewed. An EEG Technician attended to the patient, and was available throughout daytime work hours.  The epilepsy center neurologist was available in person or on call 24-hours per day.    EEG Placement and Labeling of Electrodes:  The EEG was performed utilizing 20 channel referential EEG connections (coronal over temporal over parasagittal montage) using all standard 10-20 electrode placements with EKG, with additional electrodes placed in the inferior temporal region using the modified 10-10 montage electrode placements for elective admissions, or if deemed necessary. Recording was at a sampling rate of 256 samples per second per channel. Time synchronized digital video recording was done simultaneously with EEG recording. A low light infrared camera was used for low light recording.         History:  This is a 36y RH Female with a history of DM, bipolar, depression, anxiety, migraine headache (follows [facility] neurology), asthma and nonepileptic events who was brought from [place] [id number] for possible seizure.     Patient was gambling at [place] [address], frustrated that she lost money, so she had a vodka cranberry cocktail. Patient typically does not drink EtOH beverage. Thirty to 60m later, pt began to feel lightheaded, dizzy and a little zoned out. This ill feeling worsened to generalized weakness with "jello" legs and eventual loss of awareness. She remembers bits and pieces of herself lying on the floor and being brought to [facility] via ambulance. After arrival to ER, patient stated that she has history of seizures. Got levetiracetam 1gm and a total of lorazepam 4mg in ER.     Spoke with Dr. Schwarz from [place]. Patient has had numerous inpatient admissions for seizure-like events at [place]; all of them were nonepileptic per Dr. Schwarz. The latest admission was in , where episodes of whole body stiffness, left facial twitching and decreased responsiveness was captured without ictal correlate on EEG. He had tapered her off of levetiracetam after that admission.    Per Dr. Schwarz's note, on 2018, patient was brought to [place] for convulsive activity at [place] [id number]. Convulsion could be interrupted by stimulation. Normal cvEEG.     Patient underwent another cvEEG at [facility] on  - 18. Two nonepileptic events captured. One episode of unresponsiveness, right upper extremity extension and facial twitching was recorded without ictal correlate on EEG. The other episode consisted of seeing red and black spots, feeling like she was going to pass out, legs going numb, couldn't stand up was recorded without ictal correlate on EEG.     Since ~, patient has been having episodic right lower extremity cramping and arrhythmic twitching accompanied by visual hallucination (eg. lights, figures, bugs) in the right eye (not visual field). No impaired awareness or language difficulties during these episodes. Each episode lasts about 1-2m, occurring 1-2 times daily.     In mid-s, also had a seizure-like event. Patient woke up from sleep, feeling she was still in a dream. She got out of the bed and tried to walk upstairs inside her house, and suddenly she became paralyzed from neck down. Her 4 extremities were contracts and stiff, and she couldn't move or speak. Mentation remains at baseline. This lasted for about 10m or so, gradually resolved inside ambulance en route to [facility]. She was started on clonazepam after this event.    Of note, due to initial presentation of AMS, patient underwent CTH and CTA H/N in ER, which showed "asymmetric prominent vasculature is noted in the anterior left frontal lobe, raising suspicion for a vascular anomaly." Imaging reviewed by the Neuro IR team and didn't think any further neur IR workup is needed. NSG also consulted, who recommended MRI brain and MRA head.    SEIZURE RISK FACTORS:  Younger sister with epilepsy. History of meningitis in her 20s. Patient was a product of normal pregnancy and delivery. No history of febrile seizure or TBI.    CURRENT AED:  clonazepam 0.5mg TID - started mid-, now used for anxiety    PREVIOUS AEDs:  gabapentin, valproic acid - for mood, wt gain  levetiracetam - tapered off by Dr. Schwarz in 2021 after nonepileptic events captured on cvEEG  lacosamide     IMAGING:   MRI brain w/o 2019, w/wo 2019 ([facility]): A single 2 mm focus of increased FLAIR signal in the right parietal lobe does not demonstrate enhancement or evidence of acute infarct and is nonspecific.     NEUROPHYSIOLOGY:  Numerous cvEEGs at [facility] were normal per Dr. Schwarz's note.     cvEEG at [facility] in , where episodes of whole body stiffness, left facial twitching and decreased responsiveness was captured without ictal correlate on EEG.    cvEEG at [place] on  - 18. Two nonepileptic events captured. One episode of unresponsiveness, right upper extremity extension and facial twitching was recorded without ictal correlate on EEG. The other episode consisted of seeing red and black spots, feeling like she was going to pass out, legs going numb, couldn't stand up was recorded without ictal correlate on EEG.     NEUROPSYCHOLOGY:   none    Home Antiepileptic Medication and Device  CZP 0.5mg TID – for anxiety    Interpretation:    Start Date: 2021 – Day 1                                Start Time – 17:25       Duration – 14hr 33m    Daily EEG Visual Analysis  Findings: The background was continuous and reactive. During wakefulness, the posterior dominant rhythm consisted of symmetric, well-modulated 11 Hz activity, with amplitude to 60 uV, that attenuated to eye opening.     Background Slowing:  No generalized background slowing was present.    Focal Slowing:   None were present.    Sleep Background:  Drowsiness was characterized by fragmentation, attenuation, and slowing of the background activity.    Sleep was characterized by the presence of vertex waves, symmetric sleep spindles and K-complexes.    Other Non-Epileptiform Findings:  Diffuse excess beta activity.    Interictal Epileptiform Activity:   None were present.    Events:  === Events #1-3===  Time: 18:50, 20:08, 04:14		  Seizure type: nonepileptic event  Electrographic onset location: no ictal rhythm  Electrographic evolution: no ictal rhythm  State at onset: awake    Clinical/EEG Findings  Patient pushed button x3 for typical feeling of RLE cramping. EEG immediately before, during and after the event showed baseline awake background without ictal rhythm. Intact posterior dominant rhythm seen during the event.     EKG Findings: baseline HR  Habitual event?: yes      Artifacts:  Intermittent myogenic and movement artifacts were noted.    ECG:  The heart rate on single channel ECG was predominantly between 80-90 BPM.    AED:  CZP 0.5mg TID    EEG Summary:  Normal EEG in the awake, drowsy and asleep states.  - Three events of interest recorded without ictal correlate on EEG.    Impression/Clinical Correlate:   – : Three habitual events recorded.    During this EMU admission, 3 episodes of RLE cramping recorded without ictal correlate on EEG. Normal video EEG in awake, drowsy and asleep states.    ________________________________________    Ar Mac MD  Director, Epilepsy/EMU - [facility]    [facility]  Epilepsy Monitoring Unit Report    [facility]: [address], [place] [address], Phone [phone]  [facility]: [address] [place], [place] [address], [place] [address], Phone [phone]  [place] Office: [address], [address], [place] [address] Phone [phone]    [facility]: [address] [place] [address], Phone [phone]  [place] Office: [address] [place] [address], Phone [phone]    Patient Name: Anna Villarreal    Age: 36 year, : 1985  Patient ID: -, MRN #: -, Sebastian: -    Physician Ordering Inpatient EEG: Ar Mac  Referral Source to EMU: non-elective admission – from ER    EMU Study Started: 17:25 on 21    EMU Study Ended: pending discharge    Study Information:    EEG Recording Technique:  The patient underwent continuous Video-EEG monitoring, using Telemetry System hardware on the XLTek Digital System. EEG and video data were stored on a computer hard drive with important events saved in digital archive files. The material was reviewed by a physician (electroencephalographer / epileptologist) on a daily basis. Stalin and seizure detection algorithms were utilized and reviewed. An EEG Technician attended to the patient, and was available throughout daytime work hours.  The epilepsy center neurologist was available in person or on call 24-hours per day.    EEG Placement and Labeling of Electrodes:  The EEG was performed utilizing 20 channel referential EEG connections (coronal over temporal over parasagittal montage) using all standard 10-20 electrode placements with EKG, with additional electrodes placed in the inferior temporal region using the modified 10-10 montage electrode placements for elective admissions, or if deemed necessary. Recording was at a sampling rate of 256 samples per second per channel. Time synchronized digital video recording was done simultaneously with EEG recording. A low light infrared camera was used for low light recording.         History:  This is a 36y RH Female with a history of DM, bipolar, depression, anxiety, migraine headache (follows [facility] neurology), asthma and nonepileptic events who was brought from [place] [address] for possible seizure.     Patient was gambling at [place] [address], frustrated that she lost money, so she had a vodka cranberry cocktail. Patient typically does not drink EtOH beverage. Thirty to 60m later, pt began to feel lightheaded, dizzy and a little zoned out. This ill feeling worsened to generalized weakness with "jello" legs and eventual loss of awareness. She remembers bits and pieces of herself lying on the floor and being brought to [facility] via ambulance. After arrival to ER, patient stated that she has history of seizures. Got levetiracetam 1gm and a total of lorazepam 4mg in ER.     Spoke with Dr. Schwarz from [place]. Patient has had numerous inpatient admissions for seizure-like events at [place]; all of them were nonepileptic per Dr. Schwarz. The latest admission was in , where episodes of whole body stiffness, left facial twitching and decreased responsiveness was captured without ictal correlate on EEG. He had tapered her off of levetiracetam after that admission.    Per Dr. Schwarz's note, on 2018, patient was brought to [place] for convulsive activity at [place] [address]. Convulsion could be interrupted by stimulation. Normal cvEEG.     Patient underwent another cvEEG at [place] on  - 18. Two nonepileptic events captured. One episode of unresponsiveness, right upper extremity extension and facial twitching was recorded without ictal correlate on EEG. The other episode consisted of seeing red and black spots, feeling like she was going to pass out, legs going numb, couldn't stand up was recorded without ictal correlate on EEG.     Since ~, patient has been having episodic right lower extremity cramping and arrhythmic twitching accompanied by visual hallucination (eg. lights, figures, bugs) in the right eye (not visual field). No impaired awareness or language difficulties during these episodes. Each episode lasts about 1-2m, occurring 1-2 times daily.     In mid-s, also had a seizure-like event. Patient woke up from sleep, feeling she was still in a dream. She got out of the bed and tried to walk upstairs inside her house, and suddenly she became paralyzed from neck down. Her 4 extremities were contracts and stiff, and she couldn't move or speak. Mentation remains at baseline. This lasted for about 10m or so, gradually resolved inside ambulance en route to [facility]. She was started on clonazepam after this event.    Of note, due to initial presentation of AMS, patient underwent CTH and CTA H/N in ER, which showed "asymmetric prominent vasculature is noted in the anterior left frontal lobe, raising suspicion for a vascular anomaly." Imaging reviewed by the Neuro IR team and didn't think any further neur IR workup is needed. NSG also consulted, who recommended MRI brain and MRA head.    SEIZURE RISK FACTORS:  Younger sister with epilepsy. History of meningitis in her 20s. Patient was a product of normal pregnancy and delivery. No history of febrile seizure or TBI.    CURRENT AED:  clonazepam 0.5mg TID - started mid-, now used for anxiety    PREVIOUS AEDs:  gabapentin, valproic acid - for mood, wt gain  levetiracetam - tapered off by Dr. Schwarz in 2021 after nonepileptic events captured on cvEEG  lacosamide     IMAGING:   MRI brain w/o 2019, w/wo 2019 ([facility]): A single 2 mm focus of increased FLAIR signal in the right parietal lobe does not demonstrate enhancement or evidence of acute infarct and is nonspecific.     NEUROPHYSIOLOGY:  Numerous cvEEGs at [place] were normal per Dr. Schwarz's note.     cvEEG at [place] in , where episodes of whole body stiffness, left facial twitching and decreased responsiveness was captured without ictal correlate on EEG.    cvEEG at [place] on  - 18. Two nonepileptic events captured. One episode of unresponsiveness, right upper extremity extension and facial twitching was recorded without ictal correlate on EEG. The other episode consisted of seeing red and black spots, feeling like she was going to pass out, legs going numb, couldn't stand up was recorded without ictal correlate on EEG.     NEUROPSYCHOLOGY:   none    Home Antiepileptic Medication and Device  CZP 0.5mg TID – for anxiety    Interpretation:    Start Date: 2021 – Day 1                                Start Time – 17:25       Duration – 14hr 33m    Daily EEG Visual Analysis  Findings: The background was continuous and reactive. During wakefulness, the posterior dominant rhythm consisted of symmetric, well-modulated 11 Hz activity, with amplitude to 60 uV, that attenuated to eye opening.     Background Slowing:  No generalized background slowing was present.    Focal Slowing:   None were present.    Sleep Background:  Drowsiness was characterized by fragmentation, attenuation, and slowing of the background activity.    Sleep was characterized by the presence of vertex waves, symmetric sleep spindles and K-complexes.    Other Non-Epileptiform Findings:  Diffuse excess beta activity.    Interictal Epileptiform Activity:   None were present.    Events:  === Events #1-3===  Time: 18:50, 20:08, 04:14		  Seizure type: nonepileptic event  Electrographic onset location: no ictal rhythm  Electrographic evolution: no ictal rhythm  State at onset: awake    Clinical/EEG Findings  Patient pushed button x3 for typical feeling of RLE cramping. EEG immediately before, during and after the event showed baseline awake background without ictal rhythm. Intact posterior dominant rhythm seen during the event.     EKG Findings: baseline HR  Habitual event?: yes      Artifacts:  Intermittent myogenic and movement artifacts were noted.    ECG:  The heart rate on single channel ECG was predominantly between 80-90 BPM.    AED:  CZP 0.5mg TID    EEG Summary:  Normal EEG in the awake, drowsy and asleep states.  - Three events of interest recorded without ictal correlate on EEG.  - Diffuse excess beta activity.    Impression/Clinical Correlate:   – : Three habitual events recorded.    During this EMU admission, 3 episodes of RLE cramping recorded without ictal correlate on EEG. Otherwise normal EEG, except for diffuse excess beta activity, which may be seen with medication use such as benzodiazepines or barbiturates.    Antiepileptic medication at discharge:  Continue CZP 0.5mg TID for anxiety    ________________________________________    Ar Mac MD  Director, Epilepsy/EMU - [facility]

## 2021-09-02 NOTE — DISCHARGE NOTE NURSING/CASE MANAGEMENT/SOCIAL WORK - PATIENT PORTAL LINK FT
You can access the FollowMyHealth Patient Portal offered by [facility] by registering at the following website: [email or web address]. By joining [facility]’s FollowMyHealth portal, you will also be able to view your health information using other applications (apps) compatible with our system.

## 2021-09-02 NOTE — DISCHARGE NOTE PROVIDER - NSDCCPCAREPLAN_GEN_ALL_CORE_FT
PRINCIPAL DISCHARGE DIAGNOSIS  Diagnosis: Seizure  Assessment and Plan of Treatment: vEEG was negative. F/u with epilepsy in one week. Continue with clonazepam for anxiety      SECONDARY DISCHARGE DIAGNOSES  Diagnosis: H/O bipolar disorder  Assessment and Plan of Treatment: Continue with home medications as directed. F/u with psych out patient in one week.    Diagnosis: Diabetes mellitus  Assessment and Plan of Treatment: Continue with home medications as directed. F/u with primary care provider out patient in one week.     PRINCIPAL DISCHARGE DIAGNOSIS  Diagnosis: Seizure  Assessment and Plan of Treatment: vEEG was negative. F/u with epilepsy in one week. Continue with clonazepam for anxiety      SECONDARY DISCHARGE DIAGNOSES  Diagnosis: H/O bipolar disorder  Assessment and Plan of Treatment: Continue with home medications as directed. F/u with psych out patient in one week.    Diagnosis: Diabetes mellitus  Assessment and Plan of Treatment: Continue with home medications as directed. F/u with primary care provider out patient in one week.    Diagnosis: Migraine with aura  Assessment and Plan of Treatment: F/u with Dr. Cruz in one week.

## 2021-09-28 DIAGNOSIS — Z71.89 OTHER SPECIFIED COUNSELING: ICD-10-CM

## 2021-10-05 ENCOUNTER — APPOINTMENT (OUTPATIENT)
Dept: NEUROLOGY | Facility: CLINIC | Age: 36
End: 2021-10-05

## 2021-12-01 PROCEDURE — G9005: CPT
